# Patient Record
Sex: MALE | Race: BLACK OR AFRICAN AMERICAN | NOT HISPANIC OR LATINO | Employment: FULL TIME | URBAN - METROPOLITAN AREA
[De-identification: names, ages, dates, MRNs, and addresses within clinical notes are randomized per-mention and may not be internally consistent; named-entity substitution may affect disease eponyms.]

---

## 2017-08-09 ENCOUNTER — OFFICE VISIT (OUTPATIENT)
Dept: FAMILY MEDICINE | Facility: CLINIC | Age: 57
End: 2017-08-09
Payer: COMMERCIAL

## 2017-08-09 VITALS
SYSTOLIC BLOOD PRESSURE: 124 MMHG | TEMPERATURE: 98 F | RESPIRATION RATE: 16 BRPM | WEIGHT: 257 LBS | HEART RATE: 72 BPM | BODY MASS INDEX: 35.98 KG/M2 | HEIGHT: 71 IN | DIASTOLIC BLOOD PRESSURE: 88 MMHG

## 2017-08-09 DIAGNOSIS — I10 ESSENTIAL HYPERTENSION: Primary | ICD-10-CM

## 2017-08-09 DIAGNOSIS — J30.1 ACUTE SEASONAL ALLERGIC RHINITIS DUE TO POLLEN: ICD-10-CM

## 2017-08-09 DIAGNOSIS — Z00.00 WELL ADULT EXAM: ICD-10-CM

## 2017-08-09 PROBLEM — Z00.129 WELL ADOLESCENT VISIT: Status: RESOLVED | Noted: 2017-08-09 | Resolved: 2017-08-09

## 2017-08-09 PROBLEM — Z00.129 WELL ADOLESCENT VISIT: Status: ACTIVE | Noted: 2017-08-09

## 2017-08-09 LAB
ALBUMIN SERPL-MCNC: 4.3 G/DL (ref 3.1–4.7)
ALP SERPL-CCNC: 52 IU/L (ref 40–104)
ALT (SGPT): 34 IU/L (ref 3–33)
AST SERPL-CCNC: 21 IU/L (ref 10–40)
BASOPHILS NFR BLD: 0.1 K/UL (ref 0–0.2)
BASOPHILS NFR BLD: 0.7 %
BILIRUB SERPL-MCNC: 1.5 MG/DL (ref 0.3–1)
BILIRUBIN DIRECT+TOT PNL SERPL-MCNC: 0.2 MG/DL (ref 0–0.2)
BUN SERPL-MCNC: 15 MG/DL (ref 8–20)
CALCIUM SERPL-MCNC: 9.5 MG/DL (ref 7.7–10.4)
CHLORIDE: 105 MMOL/L (ref 98–110)
CO2 SERPL-SCNC: 27.1 MMOL/L (ref 22.8–31.6)
COMPLEXED PSA SERPL-MCNC: 0.7 NG/ML (ref 0–3)
CREATININE: 1.21 MG/DL (ref 0.6–1.4)
EOSINOPHIL NFR BLD: 0.2 K/UL (ref 0–0.7)
EOSINOPHIL NFR BLD: 3.1 %
ERYTHROCYTE [DISTWIDTH] IN BLOOD BY AUTOMATED COUNT: 12.2 % (ref 11.7–14.9)
GLUCOSE: 106 MG/DL (ref 70–99)
GRAN #: 4.6 K/UL (ref 1.4–6.5)
GRAN%: 59.6 %
HBA1C MFR BLD: 5.7 % (ref 3.1–6.5)
HCT VFR BLD AUTO: 46.2 % (ref 39–55)
HGB BLD-MCNC: 16.1 G/DL (ref 14–16)
IMMATURE GRANS (ABS): 0 K/UL (ref 0–1)
IMMATURE GRANULOCYTES: 0.4 %
LYMPH #: 2.1 K/UL (ref 1.2–3.4)
LYMPH%: 27.2 %
MCH RBC QN AUTO: 31.4 PG (ref 25–35)
MCHC RBC AUTO-ENTMCNC: 34.8 G/DL (ref 31–36)
MCV RBC AUTO: 90.1 FL (ref 80–100)
MONO #: 0.7 K/UL (ref 0.1–0.6)
MONO%: 9 %
NUCLEATED RBCS: 0 %
PLATELET # BLD AUTO: 262 K/UL (ref 140–440)
PMV BLD AUTO: 9.7 FL (ref 8.8–12.7)
POTASSIUM SERPL-SCNC: 4.8 MMOL/L (ref 3.5–5)
PROT SERPL-MCNC: 7.7 G/DL (ref 6–8.2)
RBC # BLD AUTO: 5.13 M/UL (ref 4.3–5.9)
SODIUM: 139 MMOL/L (ref 134–144)
WBC # BLD AUTO: 7.7 K/UL (ref 5–10)

## 2017-08-09 PROCEDURE — 99386 PREV VISIT NEW AGE 40-64: CPT | Mod: 25,,, | Performed by: FAMILY MEDICINE

## 2017-08-09 RX ORDER — VALSARTAN 80 MG/1
TABLET ORAL
COMMUNITY
Start: 2017-06-07 | End: 2017-08-09 | Stop reason: SDUPTHER

## 2017-08-09 RX ORDER — FLUTICASONE PROPIONATE 44 UG/1
1 AEROSOL, METERED RESPIRATORY (INHALATION) 2 TIMES DAILY
Qty: 10.6 G | Refills: 0 | Status: SHIPPED | OUTPATIENT
Start: 2017-08-09 | End: 2017-08-25

## 2017-08-09 RX ORDER — VALSARTAN 80 MG/1
80 TABLET ORAL DAILY
Qty: 90 TABLET | Refills: 3 | Status: SHIPPED | OUTPATIENT
Start: 2017-08-09 | End: 2018-05-21 | Stop reason: SDUPTHER

## 2017-08-09 NOTE — PATIENT INSTRUCTIONS
Diabetes: Learning About Serving and Portion Sizes     A good rule of thumb: Devote half your plate to vegetables and green salad. Split the other half between protein and starchy carbohydrates. Fruit makes a good dessert.     Servings and portions. Whats the difference? These terms can be very confusing. But learning to measure serving sizes can help you figure out how many carbohydrates (carbs) and other foods you eat each day. They are also powerful tools for managing your weight.  Servings and portions  Many different words are used to describe amounts of food. If your health care provider uses a term youre not sure of, dont be afraid to ask. It helps to know the difference between servings and portions:  · A serving size is a fixed size. Food producers use this term to describe their products. For example, the label on a cereal box could say that 1 cup of dry cereal = 1 serving.  · A portion (also called a helping) is how much you eat or how much you put on your plate at a meal. For example, you might eat 2 cups of cereal at breakfast.  Using serving information  The portion you choose to eat (such as 2 cups of cereal) may be more than one serving as listed on the food label (such as 1 cup of cereal). Thats why it helps to measure or weigh the food you eat. Because the food label values are based on servings, youll need to know how many servings you eat at one sitting.     Ounces: 2 to 3 ounces is about the size of your palm.       1 Cup: 1 cup (or a medium-sized piece) is about the size of your fist.       1/2 Cup: 1/2 cup is about the size of your cupped hand.      One tablespoon is about the size of your thumb.  One teaspoon is about the size of the tip of your thumb.  Keeping track of serving sizes  When youre planning for a snack or a meal, keep servings in mind. If you dont have measuring cups or a scale handy, there are ways to eyeball serving sizes, such as comparing your food to the size  of your hand (see pictures above).  Managing portion sizes  If your weight is a concern, reducing your portions can help. You can eat more than one serving of a food at once. But to keep from eating too much at one meal, learn how to manage your portions. A portion is the amount of each type of food on your plate. See the plate diagram for an example of balanced portions.  Date Last Reviewed: 3/1/2016  © 3400-2034 [employer]. [address] [place] [address], [place] [address]. All rights reserved. This information is not intended as a substitute for professional medical care. Always follow your healthcare professional's instructions.

## 2017-08-09 NOTE — PROGRESS NOTES
Subjective:       Patient ID: Quentin Palacios is a 56 y.o. male.    Chief Complaint: Annual Exam    HPI  Review of Systems   Constitutional: Negative for activity change, appetite change, chills, diaphoresis, fatigue, fever and unexpected weight change.   HENT: Negative for congestion, dental problem, drooling, ear discharge, ear pain, facial swelling, hearing loss, mouth sores, nosebleeds, postnasal drip, rhinorrhea, sinus pressure, sneezing, sore throat, tinnitus, trouble swallowing and voice change.    Eyes: Negative for photophobia, pain, discharge, redness, itching and visual disturbance.   Respiratory: Positive for cough (sl producitive cough). Negative for apnea, choking, chest tightness, shortness of breath, wheezing and stridor.    Cardiovascular: Negative for chest pain, palpitations and leg swelling.   Gastrointestinal: Negative for abdominal distention, abdominal pain, anal bleeding, blood in stool, constipation, diarrhea, nausea, rectal pain and vomiting.   Endocrine: Negative for cold intolerance, heat intolerance, polydipsia, polyphagia and polyuria.   Genitourinary: Negative for decreased urine volume, difficulty urinating, discharge, dysuria, enuresis, flank pain, frequency, genital sores, hematuria, penile pain, penile swelling, scrotal swelling, testicular pain and urgency.        Voids 2x night   Musculoskeletal: Positive for back pain (lower back stiffness). Negative for arthralgias, gait problem, joint swelling, myalgias, neck pain and neck stiffness.   Skin: Negative for color change, pallor, rash and wound.   Allergic/Immunologic: Negative for environmental allergies, food allergies and immunocompromised state.   Neurological: Negative for dizziness, tremors, seizures, syncope, facial asymmetry, speech difficulty, weakness, light-headedness, numbness and headaches.   Hematological: Negative for adenopathy. Does not bruise/bleed easily.   Psychiatric/Behavioral: Negative for agitation,  behavioral problems, confusion, decreased concentration, dysphoric mood, hallucinations, self-injury, sleep disturbance and suicidal ideas. The patient is not nervous/anxious and is not hyperactive.        Objective:      Physical Exam   Constitutional: He is oriented to person, place, and time. He appears well-developed and well-nourished.  Non-toxic appearance. He does not have a sickly appearance. He does not appear ill. No distress.   HENT:   Head: Normocephalic and atraumatic.   Right Ear: External ear normal.   Left Ear: External ear normal.   Nose: Nose normal.   Mouth/Throat: Oropharynx is clear and moist. No oropharyngeal exudate.   Eyes: Conjunctivae and EOM are normal. Pupils are equal, round, and reactive to light. Right eye exhibits no discharge. Left eye exhibits no discharge. No scleral icterus.   Neck: Normal range of motion. Neck supple. No JVD present. No tracheal deviation present. No thyromegaly present.   Cardiovascular: Normal rate, regular rhythm, normal heart sounds and intact distal pulses.  Exam reveals no gallop and no friction rub.    No murmur heard.  Pulmonary/Chest: Effort normal and breath sounds normal. No stridor. No respiratory distress. He has no wheezes. He has no rales. He exhibits no tenderness.   Abdominal: Soft. Bowel sounds are normal. He exhibits no distension and no mass. There is no tenderness. There is no rebound and no guarding. No hernia.   Genitourinary: Rectum normal, prostate normal, testes normal and penis normal. Rectal exam shows guaiac negative stool. Cremasteric reflex is present. Right testis shows no mass, no swelling and no tenderness. Right testis is descended. Cremasteric reflex is not absent on the right side. Left testis shows no mass, no swelling and no tenderness. Left testis is descended. Cremasteric reflex is not absent on the left side. Circumcised. No phimosis, paraphimosis, hypospadias, penile erythema or penile tenderness. No discharge found.    Musculoskeletal: Normal range of motion. He exhibits no edema, tenderness or deformity.   Lymphadenopathy:     He has no cervical adenopathy.   Neurological: He is alert and oriented to person, place, and time. He has normal reflexes. He displays normal reflexes. No cranial nerve deficit. He exhibits normal muscle tone. Coordination normal.   Skin: Skin is warm and dry. Capillary refill takes less than 2 seconds. No rash noted. He is not diaphoretic. No erythema. No pallor.   Psychiatric: He has a normal mood and affect. His behavior is normal. Judgment and thought content normal.   Nursing note and vitals reviewed.      Assessment:       1. Acute seasonal allergic rhinitis due to pollen    2. Essential hypertension    3. Well adolescent visit        Plan:       Quentin was seen today for annual exam.    Diagnoses and all orders for this visit:    Acute seasonal allergic rhinitis due to pollen  -     fluticasone (FLOVENT HFA) 44 mcg/actuation inhaler; Inhale 1 puff into the lungs 2 (two) times daily. Controller    Essential hypertension    Well adolescent visit  -     Ambulatory referral to Gastroenterology  -     Hemoglobin A1c; Future  -     PSA, Screening; Future  -     CBC auto differential; Future  -     Basic metabolic panel; Future  -     Hepatitis C antibody; Future  -     Hepatic function panel; Future  -     Hemoglobin A1c  -     PSA, Screening  -     CBC auto differential  -     Basic metabolic panel  -     Hepatitis C antibody  -     Hepatic function panel    Other orders  -     valsartan (DIOVAN) 80 MG tablet; Take 1 tablet (80 mg total) by mouth once daily.         Return in about 1 year (around 8/9/2018).

## 2017-08-10 LAB — HCV AB SERPL QL IA: 0.4 S/CO RATIO (ref 0–0.9)

## 2017-08-18 ENCOUNTER — TELEPHONE (OUTPATIENT)
Dept: FAMILY MEDICINE | Facility: CLINIC | Age: 57
End: 2017-08-18

## 2017-08-25 DIAGNOSIS — J30.1 CHRONIC ALLERGIC RHINITIS DUE TO POLLEN, UNSPECIFIED SEASONALITY: Primary | ICD-10-CM

## 2017-08-25 RX ORDER — FLUTICASONE PROPIONATE 50 MCG
1 SPRAY, SUSPENSION (ML) NASAL DAILY
Qty: 1 BOTTLE | Refills: 0 | Status: SHIPPED | OUTPATIENT
Start: 2017-08-25 | End: 2018-12-21

## 2017-11-13 PROBLEM — Z00.00 WELL ADULT EXAM: Status: RESOLVED | Noted: 2017-08-09 | Resolved: 2017-11-13

## 2018-02-22 ENCOUNTER — OFFICE VISIT (OUTPATIENT)
Dept: FAMILY MEDICINE | Facility: CLINIC | Age: 58
End: 2018-02-22
Payer: COMMERCIAL

## 2018-02-22 VITALS
HEIGHT: 70 IN | SYSTOLIC BLOOD PRESSURE: 148 MMHG | WEIGHT: 272 LBS | TEMPERATURE: 98 F | DIASTOLIC BLOOD PRESSURE: 90 MMHG | RESPIRATION RATE: 16 BRPM | BODY MASS INDEX: 38.94 KG/M2 | HEART RATE: 76 BPM

## 2018-02-22 DIAGNOSIS — F41.9 ANXIETY: ICD-10-CM

## 2018-02-22 DIAGNOSIS — Z00.00 WELL ADULT EXAM: ICD-10-CM

## 2018-02-22 DIAGNOSIS — I10 ESSENTIAL HYPERTENSION: ICD-10-CM

## 2018-02-22 DIAGNOSIS — M17.12 PRIMARY OSTEOARTHRITIS OF LEFT KNEE: Primary | ICD-10-CM

## 2018-02-22 PROCEDURE — 3008F BODY MASS INDEX DOCD: CPT | Mod: ,,, | Performed by: FAMILY MEDICINE

## 2018-02-22 PROCEDURE — 99214 OFFICE O/P EST MOD 30 MIN: CPT | Mod: ,,, | Performed by: FAMILY MEDICINE

## 2018-02-22 RX ORDER — SERTRALINE HYDROCHLORIDE 50 MG/1
50 TABLET, FILM COATED ORAL DAILY
Qty: 30 TABLET | Refills: 11 | Status: SHIPPED | OUTPATIENT
Start: 2018-02-22 | End: 2018-12-21

## 2018-02-22 RX ORDER — MELOXICAM 7.5 MG/1
7.5 TABLET ORAL DAILY
Qty: 30 TABLET | Refills: 11 | Status: SHIPPED | OUTPATIENT
Start: 2018-02-22 | End: 2018-12-21

## 2018-02-22 NOTE — PROGRESS NOTES
Subjective:       Patient ID: Quentin Palacios is a 57 y.o. male.    Chief Complaint: Knee Pain (left ) and Anxiety      HPI    Allergies and Medications:   Review of patient's allergies indicates:  No Known Allergies  Current Outpatient Prescriptions   Medication Sig Dispense Refill    fluticasone (FLONASE) 50 mcg/actuation nasal spray 1 spray by Each Nare route once daily. 1 Bottle 0    valsartan (DIOVAN) 80 MG tablet Take 1 tablet (80 mg total) by mouth once daily. 90 tablet 3     No current facility-administered medications for this visit.        Family History:   Family History   Problem Relation Age of Onset    Diabetes Mother     Heart failure Mother     Hypertension Mother     Diabetes Father     Hypertension Father     Diabetes Sister     Hypertension Brother     Cancer Maternal Grandfather        Social History:   Social History     Social History    Marital status: Single     Spouse name: N/A    Number of children: N/A    Years of education: N/A     Occupational History    Not on file.     Social History Main Topics    Smoking status: Never Smoker    Smokeless tobacco: Never Used    Alcohol use Yes      Comment: occasionally    Drug use: No    Sexual activity: Not on file     Other Topics Concern    Not on file     Social History Narrative    No narrative on file       Review of Systems    Objective:     Vitals:    02/22/18 0928   BP: (!) 148/90   Pulse: 76   Resp: 16   Temp: 98.2 °F (36.8 °C)        Physical Exam    Assessment:       No diagnosis found.    Plan:       There are no diagnoses linked to this encounter.     No Follow-up on file.

## 2018-02-22 NOTE — PROGRESS NOTES
Subjective:       Patient ID: Quentin Palacios is a 57 y.o. male.    Chief Complaint: Knee Pain (left ) and Anxiety      Patient has gained 15 pounds since last visit.  Wt Readings from Last 1 Encounters:  02/22/18 0928 : 123.4 kg (272 lb)        Knee Pain    The incident occurred less than 1 hour ago (x years.). 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 The pain is present in the left knee. The quality of the pain is described as aching (swelling). The pain is at a severity of 4/10. The pain is mild. The pain has been improving since onset. Associated symptoms include a loss of motion and numbness. Nothing aggravates the symptoms. He has tried nothing for the symptoms. The treatment provided no relief.   Anxiety   Presents for follow-up visit. Symptoms include decreased concentration, excessive worry and nervous/anxious behavior. Patient reports no chest pain, compulsions, confusion, depressed mood, dizziness, dry mouth, feeling of choking, hyperventilation, impotence, insomnia, irritability, malaise, muscle tension, nausea, obsessions, palpitations, panic, restlessness, shortness of breath or suicidal ideas. The quality of sleep is poor. Nighttime awakenings: occasional.           Allergies and Medications:   Review of patient's allergies indicates:  No Known Allergies  Current Outpatient Prescriptions   Medication Sig Dispense Refill    fluticasone (FLONASE) 50 mcg/actuation nasal spray 1 spray by Each Nare route once daily. 1 Bottle 0    meloxicam (MOBIC) 7.5 MG tablet Take 1 tablet (7.5 mg total) by mouth once daily. 30 tablet 11    sertraline (ZOLOFT) 50 MG tablet Take 1 tablet (50 mg total) by mouth once daily. 30 tablet 11    valsartan (DIOVAN) 80 MG tablet Take 1 tablet (80 mg total) by mouth once daily. 90 tablet 3     No current facility-administered medications for this visit.        Family History:   Family History   Problem Relation Age of Onset    Diabetes Mother     Heart failure  Mother     Hypertension Mother     Diabetes Father     Hypertension Father     Diabetes Sister     Hypertension Brother     Cancer Maternal Grandfather        Social History:   Social History     Social History    Marital status: Single     Spouse name: N/A    Number of children: N/A    Years of education: N/A     Occupational History    Not on file.     Social History Main Topics    Smoking status: Never Smoker    Smokeless tobacco: Never Used    Alcohol use Yes      Comment: occasionally    Drug use: No    Sexual activity: Not on file     Other Topics Concern    Not on file     Social History Narrative    No narrative on file       Review of Systems   Constitutional: Negative for irritability.   Respiratory: Negative for shortness of breath.    Cardiovascular: Negative for chest pain and palpitations.   Gastrointestinal: Negative for nausea.   Genitourinary: Negative for impotence.   Neurological: Positive for numbness. Negative for dizziness.   Psychiatric/Behavioral: Positive for decreased concentration. Negative for confusion and suicidal ideas. The patient is nervous/anxious. The patient does not have insomnia.        Objective:     Vitals:    02/22/18 0928   BP: (!) 148/90   Pulse: 76   Resp: 16   Temp: 98.2 °F (36.8 °C)        Physical Exam    Assessment:       1. Primary osteoarthritis of left knee    2. Essential hypertension Has not been taking his Diovan regularly    3. Well adult exam    4. Anxiety        Plan:       Quentin was seen today for knee pain and anxiety.    Diagnoses and all orders for this visit:    Primary osteoarthritis of left knee  -     meloxicam (MOBIC) 7.5 MG tablet; Take 1 tablet (7.5 mg total) by mouth once daily.    Essential hypertensionTake Diovan as prescribed every day and really recheck his pressure in 2 weeks    Well adult exam    Anxiety  -     sertraline (ZOLOFT) 50 MG tablet; Take 1 tablet (50 mg total) by mouth once daily.         Follow-up in about 1  month (around 3/22/2018) for annual.

## 2018-05-21 DIAGNOSIS — E66.09 CLASS 2 OBESITY DUE TO EXCESS CALORIES WITHOUT SERIOUS COMORBIDITY WITH BODY MASS INDEX (BMI) OF 39.0 TO 39.9 IN ADULT: ICD-10-CM

## 2018-05-22 PROBLEM — E66.812 CLASS 2 OBESITY WITHOUT SERIOUS COMORBIDITY WITH BODY MASS INDEX (BMI) OF 39.0 TO 39.9 IN ADULT: Status: ACTIVE | Noted: 2018-05-22

## 2018-05-22 PROBLEM — E66.9 CLASS 2 OBESITY WITHOUT SERIOUS COMORBIDITY WITH BODY MASS INDEX (BMI) OF 39.0 TO 39.9 IN ADULT: Status: ACTIVE | Noted: 2018-05-22

## 2018-05-22 RX ORDER — VALSARTAN 80 MG/1
TABLET ORAL
Qty: 90 TABLET | Refills: 4 | Status: SHIPPED | OUTPATIENT
Start: 2018-05-22 | End: 2018-12-21

## 2018-05-28 PROBLEM — Z00.00 WELL ADULT EXAM: Status: RESOLVED | Noted: 2018-02-22 | Resolved: 2018-05-28

## 2018-08-22 ENCOUNTER — TELEPHONE (OUTPATIENT)
Dept: FAMILY MEDICINE | Facility: CLINIC | Age: 58
End: 2018-08-22

## 2018-08-22 DIAGNOSIS — E66.09 CLASS 2 OBESITY DUE TO EXCESS CALORIES WITHOUT SERIOUS COMORBIDITY WITH BODY MASS INDEX (BMI) OF 39.0 TO 39.9 IN ADULT: ICD-10-CM

## 2018-08-22 DIAGNOSIS — I10 ESSENTIAL HYPERTENSION: Primary | ICD-10-CM

## 2018-08-22 PROBLEM — E66.9 CLASS 2 OBESITY WITHOUT SERIOUS COMORBIDITY WITH BODY MASS INDEX (BMI) OF 39.0 TO 39.9 IN ADULT: Status: RESOLVED | Noted: 2018-05-22 | Resolved: 2018-08-22

## 2018-08-22 PROBLEM — E66.812 CLASS 2 OBESITY WITHOUT SERIOUS COMORBIDITY WITH BODY MASS INDEX (BMI) OF 39.0 TO 39.9 IN ADULT: Status: RESOLVED | Noted: 2018-05-22 | Resolved: 2018-08-22

## 2018-08-22 RX ORDER — IRBESARTAN 150 MG/1
150 TABLET ORAL NIGHTLY
Qty: 90 TABLET | Refills: 3 | Status: SHIPPED | OUTPATIENT
Start: 2018-08-22 | End: 2018-12-21

## 2018-12-21 ENCOUNTER — OFFICE VISIT (OUTPATIENT)
Dept: FAMILY MEDICINE | Facility: CLINIC | Age: 58
End: 2018-12-21
Payer: COMMERCIAL

## 2018-12-21 VITALS
TEMPERATURE: 98 F | HEART RATE: 78 BPM | OXYGEN SATURATION: 96 % | SYSTOLIC BLOOD PRESSURE: 150 MMHG | HEIGHT: 70 IN | WEIGHT: 275 LBS | DIASTOLIC BLOOD PRESSURE: 96 MMHG | BODY MASS INDEX: 39.37 KG/M2

## 2018-12-21 DIAGNOSIS — F41.9 ANXIETY: ICD-10-CM

## 2018-12-21 DIAGNOSIS — R00.2 PALPITATIONS: ICD-10-CM

## 2018-12-21 DIAGNOSIS — F34.1 DYSTHYMIA: ICD-10-CM

## 2018-12-21 DIAGNOSIS — I10 ESSENTIAL HYPERTENSION: Primary | ICD-10-CM

## 2018-12-21 PROBLEM — F32.A DEPRESSION: Status: ACTIVE | Noted: 2018-12-21

## 2018-12-21 PROCEDURE — 99214 OFFICE O/P EST MOD 30 MIN: CPT | Mod: 25,,, | Performed by: FAMILY MEDICINE

## 2018-12-21 PROCEDURE — 3080F DIAST BP >= 90 MM HG: CPT | Mod: ,,, | Performed by: FAMILY MEDICINE

## 2018-12-21 PROCEDURE — 3008F BODY MASS INDEX DOCD: CPT | Mod: ,,, | Performed by: FAMILY MEDICINE

## 2018-12-21 PROCEDURE — 3077F SYST BP >= 140 MM HG: CPT | Mod: ,,, | Performed by: FAMILY MEDICINE

## 2018-12-21 RX ORDER — LORAZEPAM 0.5 MG/1
0.5 TABLET ORAL EVERY 6 HOURS PRN
Qty: 7 TABLET | Refills: 0 | Status: SHIPPED | OUTPATIENT
Start: 2018-12-21 | End: 2019-01-18 | Stop reason: SDUPTHER

## 2018-12-21 RX ORDER — VALSARTAN AND HYDROCHLOROTHIAZIDE 160; 25 MG/1; MG/1
1 TABLET ORAL DAILY
Qty: 90 TABLET | Refills: 3 | Status: SHIPPED | OUTPATIENT
Start: 2018-12-21 | End: 2019-04-18 | Stop reason: SDUPTHER

## 2018-12-21 RX ORDER — PAROXETINE HYDROCHLORIDE 20 MG/1
20 TABLET, FILM COATED ORAL EVERY MORNING
Qty: 30 TABLET | Refills: 11 | Status: SHIPPED | OUTPATIENT
Start: 2018-12-21 | End: 2019-01-18

## 2018-12-21 NOTE — PROGRESS NOTES
Subjective:       Patient ID: Quentin Palacios is a 58 y.o. male.    Chief Complaint: Depression; Anxiety; and Blood pressure check      Blood pressure has been running high in the past.  BP Readings from Last 3 Encounters:  12/21/18 : (!) 150/96  02/22/18 : (!) 148/90  08/09/17 : 124/88  Also trouble focusing, agitation.  Was also seen by a nurse at work and found to have an irregular heartbeat      Depression   Visit Type: initial  Onset of symptoms: more than 1 year ago  Progression since onset: rapidly worsening  Patient presents with the following symptoms: anhedonia, decreased concentration, depressed mood and panic ( anxiety attacks.).  Patient is not experiencing: suicidal ideas and suicidal planning.    Anxiety   Symptoms include decreased concentration, depressed mood and panic ( anxiety attacks.). Patient reports no suicidal ideas.           Allergies and Medications:   Review of patient's allergies indicates:  No Known Allergies  Current Outpatient Medications   Medication Sig Dispense Refill    valsartan (DIOVAN) 80 MG tablet TAKE 1 TABLET DAILY 90 tablet 4     No current facility-administered medications for this visit.        Family History:   Family History   Problem Relation Age of Onset    Diabetes Mother     Heart failure Mother     Hypertension Mother     Diabetes Father     Hypertension Father     Diabetes Sister     Hypertension Brother     Cancer Maternal Grandfather        Social History:   Social History     Socioeconomic History    Marital status:      Spouse name: Not on file    Number of children: Not on file    Years of education: Not on file    Highest education level: Not on file   Social Needs    Financial resource strain: Not on file    Food insecurity - worry: Not on file    Food insecurity - inability: Not on file    Transportation needs - medical: Not on file    Transportation needs - non-medical: Not on file   Occupational History    Not on file   Tobacco  Use    Smoking status: Never Smoker    Smokeless tobacco: Never Used   Substance and Sexual Activity    Alcohol use: Yes     Comment: occasionally    Drug use: No    Sexual activity: Yes   Other Topics Concern    Not on file   Social History Narrative    Not on file       Review of Systems   Psychiatric/Behavioral: Positive for decreased concentration and depression. Negative for suicidal ideas.       Objective:     Vitals:    12/21/18 0949   BP: (!) 150/96   Pulse: 78   Temp: 97.6 °F (36.4 °C)        Physical Exam   Cardiovascular: Normal rate, regular rhythm, normal heart sounds and intact distal pulses. Exam reveals no gallop and no friction rub.   No murmur heard.  Pulmonary/Chest: Effort normal and breath sounds normal.     EKG was done and showed some premature atrial contractions but no other arrhythmias  Assessment:       1. Essential hypertension    2. Dysthymia    3. Palpitations he has mild PACs probably related to anxiety and elevated blood pressure        Plan:       Quentin was seen today for depression, anxiety and blood pressure check.    Diagnoses and all orders for this visit:    Essential hypertension    Dysthymia    Palpitations         No Follow-up on file.

## 2019-01-18 ENCOUNTER — OFFICE VISIT (OUTPATIENT)
Dept: FAMILY MEDICINE | Facility: CLINIC | Age: 59
End: 2019-01-18
Payer: COMMERCIAL

## 2019-01-18 ENCOUNTER — TELEPHONE (OUTPATIENT)
Dept: FAMILY MEDICINE | Facility: CLINIC | Age: 59
End: 2019-01-18

## 2019-01-18 VITALS
OXYGEN SATURATION: 98 % | SYSTOLIC BLOOD PRESSURE: 138 MMHG | TEMPERATURE: 98 F | HEART RATE: 72 BPM | BODY MASS INDEX: 39.22 KG/M2 | WEIGHT: 274 LBS | DIASTOLIC BLOOD PRESSURE: 86 MMHG | HEIGHT: 70 IN

## 2019-01-18 DIAGNOSIS — E66.9 OBESITY (BMI 35.0-39.9 WITHOUT COMORBIDITY): ICD-10-CM

## 2019-01-18 DIAGNOSIS — Z13.6 ENCOUNTER FOR LIPID SCREENING FOR CARDIOVASCULAR DISEASE: Primary | ICD-10-CM

## 2019-01-18 DIAGNOSIS — Z12.5 ENCOUNTER FOR PROSTATE CANCER SCREENING: ICD-10-CM

## 2019-01-18 DIAGNOSIS — Z00.00 WELL ADULT EXAM: ICD-10-CM

## 2019-01-18 DIAGNOSIS — F34.1 DYSTHYMIA: ICD-10-CM

## 2019-01-18 DIAGNOSIS — F41.9 ANXIETY: ICD-10-CM

## 2019-01-18 DIAGNOSIS — Z13.220 ENCOUNTER FOR LIPID SCREENING FOR CARDIOVASCULAR DISEASE: Primary | ICD-10-CM

## 2019-01-18 LAB
ALBUMIN SERPL-MCNC: 4 G/DL (ref 3.1–4.7)
ALP SERPL-CCNC: 56 IU/L (ref 40–104)
ALT (SGPT): 27 IU/L (ref 3–33)
AST SERPL-CCNC: 17 IU/L (ref 10–40)
BASOPHILS NFR BLD: 0.1 K/UL (ref 0–0.2)
BASOPHILS NFR BLD: 0.6 %
BILIRUB SERPL-MCNC: 1.1 MG/DL (ref 0.3–1)
BUN SERPL-MCNC: 17 MG/DL (ref 8–20)
CALCIUM SERPL-MCNC: 9.1 MG/DL (ref 7.7–10.4)
CHLORIDE: 103 MMOL/L (ref 98–110)
CO2 SERPL-SCNC: 28 MMOL/L (ref 22.8–31.6)
COMPLEXED PSA SERPL-MCNC: 1.3 NG/ML (ref 0–3)
CREATININE: 1.11 MG/DL (ref 0.6–1.4)
EOSINOPHIL NFR BLD: 0.2 K/UL (ref 0–0.7)
EOSINOPHIL NFR BLD: 1.9 %
ERYTHROCYTE [DISTWIDTH] IN BLOOD BY AUTOMATED COUNT: 12.7 % (ref 11.7–14.9)
GLUCOSE: 102 MG/DL (ref 70–99)
GRAN #: 6.1 K/UL (ref 1.4–6.5)
GRAN%: 67.5 %
HCT VFR BLD AUTO: 45 % (ref 39–55)
HGB BLD-MCNC: 15.2 G/DL (ref 14–16)
IMMATURE GRANS (ABS): 0 K/UL (ref 0–1)
IMMATURE GRANULOCYTES: 0.3 %
LYMPH #: 1.9 K/UL (ref 1.2–3.4)
LYMPH%: 21.4 %
MCH RBC QN AUTO: 31.3 PG (ref 25–35)
MCHC RBC AUTO-ENTMCNC: 33.8 G/DL (ref 31–36)
MCV RBC AUTO: 92.6 FL (ref 80–100)
MONO #: 0.7 K/UL (ref 0.1–0.6)
MONO%: 8.3 %
NUCLEATED RBCS: 0 %
PLATELET # BLD AUTO: 253 K/UL (ref 140–440)
PMV BLD AUTO: 10.7 FL (ref 8.8–12.7)
POTASSIUM SERPL-SCNC: 4.1 MMOL/L (ref 3.5–5)
PROT SERPL-MCNC: 7.7 G/DL (ref 6–8.2)
RBC # BLD AUTO: 4.86 M/UL (ref 4.3–5.9)
SODIUM: 138 MMOL/L (ref 134–144)
VITAMIN D, 1,25 (OH)2: 21.2 NG/ML (ref 30–100)
WBC # BLD AUTO: 9 K/UL (ref 5–10)

## 2019-01-18 PROCEDURE — 3079F PR MOST RECENT DIASTOLIC BLOOD PRESSURE 80-89 MM HG: ICD-10-PCS | Mod: ,,, | Performed by: FAMILY MEDICINE

## 2019-01-18 PROCEDURE — 99213 OFFICE O/P EST LOW 20 MIN: CPT | Mod: ,,, | Performed by: FAMILY MEDICINE

## 2019-01-18 PROCEDURE — 99213 PR OFFICE/OUTPT VISIT, EST, LEVL III, 20-29 MIN: ICD-10-PCS | Mod: ,,, | Performed by: FAMILY MEDICINE

## 2019-01-18 PROCEDURE — 3075F SYST BP GE 130 - 139MM HG: CPT | Mod: ,,, | Performed by: FAMILY MEDICINE

## 2019-01-18 PROCEDURE — 3075F PR MOST RECENT SYSTOLIC BLOOD PRESS GE 130-139MM HG: ICD-10-PCS | Mod: ,,, | Performed by: FAMILY MEDICINE

## 2019-01-18 PROCEDURE — 3079F DIAST BP 80-89 MM HG: CPT | Mod: ,,, | Performed by: FAMILY MEDICINE

## 2019-01-18 PROCEDURE — 3008F BODY MASS INDEX DOCD: CPT | Mod: ,,, | Performed by: FAMILY MEDICINE

## 2019-01-18 PROCEDURE — 3008F PR BODY MASS INDEX (BMI) DOCUMENTED: ICD-10-PCS | Mod: ,,, | Performed by: FAMILY MEDICINE

## 2019-01-18 RX ORDER — LORAZEPAM 0.5 MG/1
0.5 TABLET ORAL EVERY 6 HOURS PRN
Qty: 30 TABLET | Refills: 0 | Status: SHIPPED | OUTPATIENT
Start: 2019-01-18 | End: 2021-01-08

## 2019-01-18 RX ORDER — PAROXETINE HYDROCHLORIDE 40 MG/1
40 TABLET, FILM COATED ORAL EVERY MORNING
Qty: 30 TABLET | Refills: 11 | Status: SHIPPED | OUTPATIENT
Start: 2019-01-18 | End: 2019-04-03

## 2019-01-18 RX ORDER — VIT C/E/ZN/COPPR/LUTEIN/ZEAXAN 250MG-90MG
1000 CAPSULE ORAL DAILY
Qty: 90 CAPSULE | Refills: 3 | Status: SHIPPED | OUTPATIENT
Start: 2019-01-18 | End: 2020-01-18

## 2019-01-18 NOTE — PROGRESS NOTES
Subjective:       Patient ID: Quentin Palacios is a 58 y.o. male.    Chief Complaint: Depression and Anxiety      Patient has patient has anxiety disorder and agoraphobia and is here for follow-up he says the Ativan works great in the situations when he has to of present or perform in front of large groups of people. Paxil is helping but he was to go to higher dose. No side effects or contraindications.        Depression   Visit Type: follow-up  Patient is not experiencing: anhedonia, chest pain, choking sensation, compulsions, confusion, decreased concentration, depressed mood, dizziness, dry mouth, excessive worry, fatigue, feelings of hopelessness, feelings of worthlessness, hypersomnia, hyperventilation, impotence, insomnia, irritability, malaise, memory impairment, muscle tension, nausea, nervousness/anxiety, obsessions, palpitations, panic, psychomotor agitation, psychomotor retardation, restlessness, shortness of breath, suicidal ideas, suicidal planning, thoughts of death, weight gain and weight loss.  Frequency of symptoms: constantly   Severity: moderate   Sleep quality: good  Nighttime awakenings: occasional    Anxiety   Patient reports no compulsions, confusion, decreased concentration, depressed mood, dry mouth, excessive worry, hyperventilation, impotence, insomnia, irritability, malaise, muscle tension, nervous/anxious behavior, obsessions, palpitations, panic, restlessness, shortness of breath or suicidal ideas.           Allergies and Medications:   Review of patient's allergies indicates:  No Known Allergies  Current Outpatient Medications   Medication Sig Dispense Refill    LORazepam (ATIVAN) 0.5 MG tablet Take 1 tablet (0.5 mg total) by mouth every 6 (six) hours as needed for Anxiety. 7 tablet 0    paroxetine (PAXIL) 20 MG tablet Take 1 tablet (20 mg total) by mouth every morning. 30 tablet 11    valsartan-hydrochlorothiazide (DIOVAN-HCT) 160-25 mg per tablet Take 1 tablet by mouth once daily.  90 tablet 3     No current facility-administered medications for this visit.        Family History:   Family History   Problem Relation Age of Onset    Diabetes Mother     Heart failure Mother     Hypertension Mother     Diabetes Father     Hypertension Father     Diabetes Sister     Hypertension Brother     Cancer Maternal Grandfather        Social History:   Social History     Socioeconomic History    Marital status:      Spouse name: Not on file    Number of children: Not on file    Years of education: Not on file    Highest education level: Not on file   Social Needs    Financial resource strain: Not on file    Food insecurity - worry: Not on file    Food insecurity - inability: Not on file    Transportation needs - medical: Not on file    Transportation needs - non-medical: Not on file   Occupational History    Not on file   Tobacco Use    Smoking status: Never Smoker    Smokeless tobacco: Never Used   Substance and Sexual Activity    Alcohol use: Yes     Comment: occasionally    Drug use: No    Sexual activity: Yes   Other Topics Concern    Not on file   Social History Narrative    Not on file       Review of Systems   Constitutional: Negative for irritability, weight gain and weight loss.   Respiratory: Negative for choking and shortness of breath.    Cardiovascular: Negative for palpitations.   Genitourinary: Negative for impotence.   Psychiatric/Behavioral: Positive for depression. Negative for confusion, decreased concentration and suicidal ideas. The patient is not nervous/anxious and does not have insomnia.        Objective:     Vitals:    01/18/19 0935   BP: 138/86   Pulse: 72   Temp: 97.9 °F (36.6 °C)        Physical Exam    Assessment:       No diagnosis found.    Plan:       There are no diagnoses linked to this encounter.     No Follow-up on file.

## 2019-01-18 NOTE — TELEPHONE ENCOUNTER
----- Message from Oz Pérez MD sent at 1/18/2019  4:32 PM CST -----  Low vitamin D level. Will add vitamin D 1000 units per day and recheck in one month

## 2019-01-19 LAB — HCV AB SERPL QL IA: <0.1 S/CO RATIO (ref 0–0.9)

## 2019-01-21 ENCOUNTER — TELEPHONE (OUTPATIENT)
Dept: FAMILY MEDICINE | Facility: CLINIC | Age: 59
End: 2019-01-21

## 2019-04-03 ENCOUNTER — OFFICE VISIT (OUTPATIENT)
Dept: FAMILY MEDICINE | Facility: CLINIC | Age: 59
End: 2019-04-03
Payer: COMMERCIAL

## 2019-04-03 VITALS
BODY MASS INDEX: 39.96 KG/M2 | DIASTOLIC BLOOD PRESSURE: 98 MMHG | RESPIRATION RATE: 20 BRPM | OXYGEN SATURATION: 98 % | HEIGHT: 70 IN | WEIGHT: 279.13 LBS | SYSTOLIC BLOOD PRESSURE: 148 MMHG | TEMPERATURE: 98 F | HEART RATE: 89 BPM

## 2019-04-03 DIAGNOSIS — R00.2 PALPITATIONS: Primary | ICD-10-CM

## 2019-04-03 DIAGNOSIS — R42 DIZZINESS: ICD-10-CM

## 2019-04-03 DIAGNOSIS — I10 ESSENTIAL HYPERTENSION: ICD-10-CM

## 2019-04-03 DIAGNOSIS — F41.9 ANXIETY: ICD-10-CM

## 2019-04-03 PROCEDURE — 3077F PR MOST RECENT SYSTOLIC BLOOD PRESSURE >= 140 MM HG: ICD-10-PCS | Mod: ,,, | Performed by: FAMILY MEDICINE

## 2019-04-03 PROCEDURE — 3008F PR BODY MASS INDEX (BMI) DOCUMENTED: ICD-10-PCS | Mod: ,,, | Performed by: FAMILY MEDICINE

## 2019-04-03 PROCEDURE — 3077F SYST BP >= 140 MM HG: CPT | Mod: ,,, | Performed by: FAMILY MEDICINE

## 2019-04-03 PROCEDURE — 3080F PR MOST RECENT DIASTOLIC BLOOD PRESSURE >= 90 MM HG: ICD-10-PCS | Mod: ,,, | Performed by: FAMILY MEDICINE

## 2019-04-03 PROCEDURE — 99213 OFFICE O/P EST LOW 20 MIN: CPT | Mod: ,,, | Performed by: FAMILY MEDICINE

## 2019-04-03 PROCEDURE — 99213 PR OFFICE/OUTPT VISIT, EST, LEVL III, 20-29 MIN: ICD-10-PCS | Mod: ,,, | Performed by: FAMILY MEDICINE

## 2019-04-03 PROCEDURE — 3080F DIAST BP >= 90 MM HG: CPT | Mod: ,,, | Performed by: FAMILY MEDICINE

## 2019-04-03 PROCEDURE — 3008F BODY MASS INDEX DOCD: CPT | Mod: ,,, | Performed by: FAMILY MEDICINE

## 2019-04-03 RX ORDER — METOPROLOL SUCCINATE 25 MG/1
25 TABLET, EXTENDED RELEASE ORAL DAILY
Qty: 30 TABLET | Refills: 11 | Status: SHIPPED | OUTPATIENT
Start: 2019-04-03 | End: 2020-05-15

## 2019-04-03 RX ORDER — ESCITALOPRAM OXALATE 10 MG/1
10 TABLET ORAL DAILY
Qty: 30 TABLET | Refills: 11 | Status: SHIPPED | OUTPATIENT
Start: 2019-04-03 | End: 2023-07-28

## 2019-04-03 NOTE — PROGRESS NOTES
Subjective:       Patient ID: Quentin Palacios is a 58 y.o. male.    Chief Complaint: Dizziness (x2 days but he had an episode 2 weeks ago) and Nausea      Transient dizziness, just for a fraction of a second when turning his head left or right. Associated with some transient nausea as well. Occur with exercise on the treadmill. Is also history of motion sickness in the past.  Tinkering in left heel and went occurs with dizzy spells.  Stopped Paxil 2 weeks ago.      Dizziness:   Chronicity:  Recurrent  Onset:  Yesterday  Pain Scale:  0/10  Duration:  Very brief  Dizziness characteristics:  Spacial disorientation, off-balance, lightheaded/impending faint and motion-sickness  Frequency of Spells:  Hourly   Associated symptoms: nausea.no hearing loss, no aural fullness, no palpitations and no chest pain.   nausea  Nausea   Associated symptoms include nausea. Pertinent negatives include no chest pain.       Allergies and Medications:   Review of patient's allergies indicates:  No Known Allergies  Current Outpatient Medications   Medication Sig Dispense Refill    cholecalciferol, vitamin D3, (VITAMIN D3) 1,000 unit capsule Take 1 capsule (1,000 Units total) by mouth once daily. 90 capsule 3    LORazepam (ATIVAN) 0.5 MG tablet Take 1 tablet (0.5 mg total) by mouth every 6 (six) hours as needed for Anxiety. 30 tablet 0    valsartan-hydrochlorothiazide (DIOVAN-HCT) 160-25 mg per tablet Take 1 tablet by mouth once daily. 90 tablet 3    escitalopram oxalate (LEXAPRO) 10 MG tablet Take 1 tablet (10 mg total) by mouth once daily. 30 tablet 11    metoprolol succinate (TOPROL-XL) 25 MG 24 hr tablet Take 1 tablet (25 mg total) by mouth once daily. 30 tablet 11     No current facility-administered medications for this visit.        Family History:   Family History   Problem Relation Age of Onset    Diabetes Mother     Heart failure Mother     Hypertension Mother     Diabetes Father     Hypertension Father     Diabetes  Sister     Hypertension Brother     Cancer Maternal Grandfather        Social History:       Review of Systems   HENT: Negative for hearing loss.    Cardiovascular: Negative for chest pain and palpitations.   Gastrointestinal: Positive for nausea.   Neurological: Positive for dizziness.       Objective:     Vitals:    04/03/19 1444   BP: (!) 140/92   Pulse: 89   Resp: 20   Temp: 97.8 °F (36.6 °C)        Physical Exam   Constitutional: He appears well-developed and well-nourished.   Cardiovascular: Normal rate, regular rhythm, normal heart sounds and intact distal pulses. Exam reveals no gallop and no friction rub.   No murmur heard.  Pulmonary/Chest: Effort normal and breath sounds normal. No stridor. No respiratory distress. He has no wheezes. He has no rales. He exhibits no tenderness.   Neurological:   Presacral round and reactive to light and accommodation extraocular movements intact cranial nerves II through XII grossly intact. Negative Ana Maria-Hallpike left and right.       Assessment:       1. Palpitations -did not get to see the cardiologist at that time.    2. Dizziness -transient and benign normal neurologic exam today.        Plan:       Quentin was seen today for dizziness and nausea.    Diagnoses and all orders for this visit:    Palpitations  -     Ambulatory consult to Cardiology  -     metoprolol succinate (TOPROL-XL) 25 MG 24 hr tablet; Take 1 tablet (25 mg total) by mouth once daily.    Dizziness  -     Ambulatory consult to Cardiology  -     metoprolol succinate (TOPROL-XL) 25 MG 24 hr tablet; Take 1 tablet (25 mg total) by mouth once daily.    Essential hypertension    Anxiety  -     escitalopram oxalate (LEXAPRO) 10 MG tablet; Take 1 tablet (10 mg total) by mouth once daily.         Follow up in about 1 month (around 5/3/2019).

## 2019-04-11 ENCOUNTER — TELEPHONE (OUTPATIENT)
Dept: FAMILY MEDICINE | Facility: CLINIC | Age: 59
End: 2019-04-11

## 2019-04-11 NOTE — TELEPHONE ENCOUNTER
Pt LM on VM that BP was 180/100.  Returned call and was unable to reach the patient.  VM was full and unable to leave message.

## 2019-04-11 NOTE — TELEPHONE ENCOUNTER
Pt left another message re his BP of 180/108.  Returned the call.  The patient was pulling up at the cardiologist office for a visit.  I instructed the patient to address the matter with his cardiologist immediately.   He verbalized understanding.  Should they not address same, he should immediately go to the ER.

## 2019-04-18 ENCOUNTER — OFFICE VISIT (OUTPATIENT)
Dept: FAMILY MEDICINE | Facility: CLINIC | Age: 59
End: 2019-04-18
Payer: COMMERCIAL

## 2019-04-18 VITALS
HEIGHT: 70 IN | OXYGEN SATURATION: 95 % | DIASTOLIC BLOOD PRESSURE: 102 MMHG | BODY MASS INDEX: 40.84 KG/M2 | HEART RATE: 82 BPM | TEMPERATURE: 99 F | WEIGHT: 285.31 LBS | SYSTOLIC BLOOD PRESSURE: 148 MMHG

## 2019-04-18 DIAGNOSIS — Z12.11 SCREENING FOR COLON CANCER: ICD-10-CM

## 2019-04-18 DIAGNOSIS — Z13.6 ENCOUNTER FOR LIPID SCREENING FOR CARDIOVASCULAR DISEASE: ICD-10-CM

## 2019-04-18 DIAGNOSIS — I10 ESSENTIAL HYPERTENSION: ICD-10-CM

## 2019-04-18 DIAGNOSIS — I10 ACCELERATED HYPERTENSION: Primary | ICD-10-CM

## 2019-04-18 DIAGNOSIS — E66.9 OBESITY (BMI 35.0-39.9 WITHOUT COMORBIDITY): ICD-10-CM

## 2019-04-18 DIAGNOSIS — Z13.220 ENCOUNTER FOR LIPID SCREENING FOR CARDIOVASCULAR DISEASE: ICD-10-CM

## 2019-04-18 DIAGNOSIS — E55.9 VITAMIN D DEFICIENCY: ICD-10-CM

## 2019-04-18 DIAGNOSIS — Z00.00 PREVENTATIVE HEALTH CARE: ICD-10-CM

## 2019-04-18 DIAGNOSIS — Z12.5 ENCOUNTER FOR PROSTATE CANCER SCREENING: ICD-10-CM

## 2019-04-18 DIAGNOSIS — E66.01 CLASS 3 SEVERE OBESITY DUE TO EXCESS CALORIES WITHOUT SERIOUS COMORBIDITY WITH BODY MASS INDEX (BMI) OF 40.0 TO 44.9 IN ADULT: ICD-10-CM

## 2019-04-18 PROBLEM — E66.813 CLASS 3 SEVERE OBESITY WITH BODY MASS INDEX (BMI) OF 40.0 TO 44.9 IN ADULT: Status: ACTIVE | Noted: 2018-05-22

## 2019-04-18 PROCEDURE — 99396 PREV VISIT EST AGE 40-64: CPT | Mod: ,,, | Performed by: FAMILY MEDICINE

## 2019-04-18 PROCEDURE — 3077F PR MOST RECENT SYSTOLIC BLOOD PRESSURE >= 140 MM HG: ICD-10-PCS | Mod: ,,, | Performed by: FAMILY MEDICINE

## 2019-04-18 PROCEDURE — 3080F PR MOST RECENT DIASTOLIC BLOOD PRESSURE >= 90 MM HG: ICD-10-PCS | Mod: ,,, | Performed by: FAMILY MEDICINE

## 2019-04-18 PROCEDURE — 99396 PR PREVENTIVE VISIT,EST,40-64: ICD-10-PCS | Mod: ,,, | Performed by: FAMILY MEDICINE

## 2019-04-18 PROCEDURE — 3080F DIAST BP >= 90 MM HG: CPT | Mod: ,,, | Performed by: FAMILY MEDICINE

## 2019-04-18 PROCEDURE — 3077F SYST BP >= 140 MM HG: CPT | Mod: ,,, | Performed by: FAMILY MEDICINE

## 2019-04-18 RX ORDER — TERAZOSIN 2 MG/1
2 CAPSULE ORAL NIGHTLY
Qty: 30 CAPSULE | Refills: 11 | Status: SHIPPED | OUTPATIENT
Start: 2019-04-18 | End: 2021-01-08

## 2019-04-18 RX ORDER — AMLODIPINE BESYLATE 10 MG/1
TABLET ORAL
Refills: 3 | COMMUNITY
Start: 2019-04-15 | End: 2021-01-11 | Stop reason: SDUPTHER

## 2019-04-18 RX ORDER — VALSARTAN AND HYDROCHLOROTHIAZIDE 160; 25 MG/1; MG/1
1 TABLET ORAL DAILY
Qty: 90 TABLET | Refills: 3 | Status: SHIPPED | OUTPATIENT
Start: 2019-04-18 | End: 2020-05-15

## 2019-04-18 RX ORDER — TERAZOSIN 1 MG/1
CAPSULE ORAL
Refills: 3 | COMMUNITY
Start: 2019-04-15 | End: 2019-04-18

## 2019-04-18 NOTE — PATIENT INSTRUCTIONS
Home blood pressure measurements is a large cuff or wrist cough.  My fitness spell 4-calorie logging

## 2019-04-18 NOTE — PROGRESS NOTES
Subjective:       Patient ID: Quentin Palacios is a 58 y.o. male.    Chief Complaint: Annual Exam      Patient is here for his annual well checkup today he has been seeing his cardiologist and is now on 4 different antihypertensives and is still elevated today 148/98.  Wt Readings from Last 3 Encounters:  04/18/19 : 129.4 kg (285 lb 4.8 oz)  04/03/19 : 126.6 kg (279 lb 1.6 oz)  01/18/19 : 124.3 kg (274 lb)             Allergies and Medications:   Review of patient's allergies indicates:  No Known Allergies  Current Outpatient Medications   Medication Sig Dispense Refill    amLODIPine (NORVASC) 10 MG tablet TK 1 T PO QD  3    cholecalciferol, vitamin D3, (VITAMIN D3) 1,000 unit capsule Take 1 capsule (1,000 Units total) by mouth once daily. 90 capsule 3    escitalopram oxalate (LEXAPRO) 10 MG tablet Take 1 tablet (10 mg total) by mouth once daily. 30 tablet 11    metoprolol succinate (TOPROL-XL) 25 MG 24 hr tablet Take 1 tablet (25 mg total) by mouth once daily. 30 tablet 11    valsartan-hydrochlorothiazide (DIOVAN-HCT) 160-25 mg per tablet Take 1 tablet by mouth once daily. 90 tablet 3    LORazepam (ATIVAN) 0.5 MG tablet Take 1 tablet (0.5 mg total) by mouth every 6 (six) hours as needed for Anxiety. 30 tablet 0    terazosin (HYTRIN) 2 MG capsule Take 1 capsule (2 mg total) by mouth every evening. 30 capsule 11     No current facility-administered medications for this visit.        Family History:   Family History   Problem Relation Age of Onset    Diabetes Mother     Heart failure Mother     Hypertension Mother     Diabetes Father     Hypertension Father     Diabetes Sister     Hypertension Brother     Cancer Maternal Grandfather        Social History:   Social History     Socioeconomic History    Marital status:      Spouse name: Not on file    Number of children: Not on file    Years of education: Not on file    Highest education level: Not on file   Occupational History    Not on file    Social Needs    Financial resource strain: Not on file    Food insecurity:     Worry: Not on file     Inability: Not on file    Transportation needs:     Medical: Not on file     Non-medical: Not on file   Tobacco Use    Smoking status: Never Smoker    Smokeless tobacco: Never Used   Substance and Sexual Activity    Alcohol use: Yes     Comment: occasionally    Drug use: No    Sexual activity: Yes   Lifestyle    Physical activity:     Days per week: Not on file     Minutes per session: Not on file    Stress: Not on file   Relationships    Social connections:     Talks on phone: Not on file     Gets together: Not on file     Attends [employer] service: Not on file     Active member of club or organization: Not on file     Attends meetings of clubs or organizations: Not on file     Relationship status: Not on file   Other Topics Concern    Not on file   Social History Narrative    Not on file       Review of Systems   Constitutional: Positive for unexpected weight change (increased 11 lb). Negative for activity change, appetite change, chills, diaphoresis, fatigue and fever.   HENT: Negative for congestion, dental problem, drooling, ear discharge, ear pain, facial swelling, hearing loss, mouth sores, nosebleeds, postnasal drip, rhinorrhea, sinus pressure, sinus pain, sneezing, sore throat, tinnitus, trouble swallowing and voice change.    Eyes: Negative for photophobia, pain, discharge, redness, itching and visual disturbance.   Respiratory: Negative for apnea, cough, choking, chest tightness, shortness of breath, wheezing and stridor.    Cardiovascular: Negative for chest pain, palpitations and leg swelling.   Gastrointestinal: Negative for abdominal distention, abdominal pain, anal bleeding, blood in stool, constipation, diarrhea, nausea, rectal pain and vomiting.   Endocrine: Negative for cold intolerance, heat intolerance, polydipsia, polyphagia and polyuria.   Genitourinary: Negative for decreased  urine volume, difficulty urinating, discharge, dysuria, enuresis, flank pain, frequency, genital sores, hematuria, penile pain, penile swelling, scrotal swelling, testicular pain and urgency.        One / night   Musculoskeletal: Positive for back pain (occ back pain). Negative for arthralgias, gait problem, joint swelling, myalgias, neck pain and neck stiffness.   Skin: Negative for color change, pallor, rash and wound.   Allergic/Immunologic: Negative for environmental allergies, food allergies and immunocompromised state.   Neurological: Negative for dizziness, tremors, seizures, syncope, facial asymmetry, speech difficulty, weakness, light-headedness, numbness and headaches.   Hematological: Negative for adenopathy. Does not bruise/bleed easily.   Psychiatric/Behavioral: Negative for agitation, behavioral problems, confusion, decreased concentration, dysphoric mood, hallucinations, self-injury, sleep disturbance and suicidal ideas. The patient is not nervous/anxious and is not hyperactive.        Objective:     Vitals:    04/18/19 1422   BP: (!) 148/102   Pulse:    Temp:         Physical Exam   Constitutional: He is oriented to person, place, and time. He appears well-developed and well-nourished.  Non-toxic appearance. He does not have a sickly appearance. He does not appear ill. No distress.   HENT:   Head: Normocephalic and atraumatic.   Right Ear: External ear normal.   Left Ear: External ear normal.   Nose: Nose normal.   Mouth/Throat: Oropharynx is clear and moist. No oropharyngeal exudate.   Eyes: Pupils are equal, round, and reactive to light. Conjunctivae and EOM are normal. Right eye exhibits no discharge. Left eye exhibits no discharge. No scleral icterus.   Neck: Normal range of motion. Neck supple. No JVD present. No tracheal deviation present. No thyromegaly present.   Cardiovascular: Normal rate, normal heart sounds and intact distal pulses. Exam reveals no gallop and no friction rub.   No murmur  heard.  Pulmonary/Chest: Effort normal and breath sounds normal. No stridor. No respiratory distress. He has no wheezes. He has no rales. He exhibits no tenderness.   Abdominal: Soft. Bowel sounds are normal. He exhibits no distension and no mass. There is no tenderness. There is no rebound and no guarding. No hernia.   Genitourinary: Rectum normal, prostate normal, testes normal and penis normal. Rectal exam shows guaiac negative stool. Cremasteric reflex is present. Right testis shows no mass, no swelling and no tenderness. Right testis is descended. Cremasteric reflex is not absent on the right side. Left testis shows no mass, no swelling and no tenderness. Left testis is descended. Cremasteric reflex is not absent on the left side. Circumcised. No phimosis, paraphimosis, hypospadias, penile erythema or penile tenderness. No discharge found.   Musculoskeletal: He exhibits no edema, tenderness or deformity.   Lymphadenopathy:     He has no cervical adenopathy.   Neurological: He is alert and oriented to person, place, and time. He has normal reflexes. He displays normal reflexes. No cranial nerve deficit or sensory deficit. He exhibits normal muscle tone. Coordination normal.   Skin: Skin is warm and dry. Capillary refill takes less than 2 seconds. No rash noted. He is not diaphoretic. No erythema. No pallor.   Psychiatric: He has a normal mood and affect. His behavior is normal. Judgment and thought content normal.   Nursing note and vitals reviewed.    previous labs from January reviews PSA was 1.3 previously 0.7 but still within normal range patient advised to recheck in one year.    Assessment:       1. Accelerated hypertension    2. Essential hypertension    3. Encounter for lipid screening for cardiovascular disease    4. Encounter for prostate cancer screening    5. Obesity (BMI 35.0-39.9 without comorbidity)    6. Class 3 severe obesity due to excess calories without serious comorbidity with body mass  index (BMI) of 40.0 to 44.9 in adult    7. Screening for colon cancer    8. Vitamin D deficiency    9. Preventative health care        Plan:       Quentin was seen today for annual exam.    Diagnoses and all orders for this visit:    Accelerated hypertension  -     terazosin (HYTRIN) 2 MG capsule; Take 1 capsule (2 mg total) by mouth every evening.  -     Polysomnogram (CPAP will be added if patient meets diagnostic criteria.); Future    Essential hypertension  -     valsartan-hydrochlorothiazide (DIOVAN-HCT) 160-25 mg per tablet; Take 1 tablet by mouth once daily.    Encounter for lipid screening for cardiovascular disease    Encounter for prostate cancer screening    Obesity (BMI 35.0-39.9 without comorbidity)    Class 3 severe obesity due to excess calories without serious comorbidity with body mass index (BMI) of 40.0 to 44.9 in adult    Screening for colon cancer  -     Ambulatory referral to Gastroenterology    Vitamin D deficiency  -     Vitamin D; Future  -     Vitamin D    Preventative health care         Follow up in about 1 month (around 5/18/2019) for follow up HTN, follow up cholesterol.

## 2019-04-22 PROBLEM — Z13.6 ENCOUNTER FOR LIPID SCREENING FOR CARDIOVASCULAR DISEASE: Status: RESOLVED | Noted: 2019-01-18 | Resolved: 2019-04-22

## 2019-04-22 PROBLEM — Z13.220 ENCOUNTER FOR LIPID SCREENING FOR CARDIOVASCULAR DISEASE: Status: RESOLVED | Noted: 2019-01-18 | Resolved: 2019-04-22

## 2019-04-22 PROBLEM — Z00.00 WELL ADULT EXAM: Status: RESOLVED | Noted: 2019-01-18 | Resolved: 2019-04-22

## 2019-07-02 ENCOUNTER — OFFICE VISIT (OUTPATIENT)
Dept: FAMILY MEDICINE | Facility: CLINIC | Age: 59
End: 2019-07-02
Payer: COMMERCIAL

## 2019-07-02 VITALS
HEIGHT: 70 IN | OXYGEN SATURATION: 97 % | TEMPERATURE: 98 F | BODY MASS INDEX: 39.58 KG/M2 | HEART RATE: 72 BPM | DIASTOLIC BLOOD PRESSURE: 94 MMHG | WEIGHT: 276.5 LBS | RESPIRATION RATE: 16 BRPM | SYSTOLIC BLOOD PRESSURE: 140 MMHG

## 2019-07-02 DIAGNOSIS — S01.81XA LACERATION OF SKIN OF FACE, INITIAL ENCOUNTER: Primary | ICD-10-CM

## 2019-07-02 DIAGNOSIS — I10 ACCELERATED HYPERTENSION: ICD-10-CM

## 2019-07-02 DIAGNOSIS — R55 VASOVAGAL SYNCOPE: ICD-10-CM

## 2019-07-02 PROCEDURE — 3080F PR MOST RECENT DIASTOLIC BLOOD PRESSURE >= 90 MM HG: ICD-10-PCS | Mod: ,,, | Performed by: FAMILY MEDICINE

## 2019-07-02 PROCEDURE — 99213 OFFICE O/P EST LOW 20 MIN: CPT | Mod: ,,, | Performed by: FAMILY MEDICINE

## 2019-07-02 PROCEDURE — 3008F PR BODY MASS INDEX (BMI) DOCUMENTED: ICD-10-PCS | Mod: ,,, | Performed by: FAMILY MEDICINE

## 2019-07-02 PROCEDURE — 3077F SYST BP >= 140 MM HG: CPT | Mod: ,,, | Performed by: FAMILY MEDICINE

## 2019-07-02 PROCEDURE — 3077F PR MOST RECENT SYSTOLIC BLOOD PRESSURE >= 140 MM HG: ICD-10-PCS | Mod: ,,, | Performed by: FAMILY MEDICINE

## 2019-07-02 PROCEDURE — 3080F DIAST BP >= 90 MM HG: CPT | Mod: ,,, | Performed by: FAMILY MEDICINE

## 2019-07-02 PROCEDURE — 3008F BODY MASS INDEX DOCD: CPT | Mod: ,,, | Performed by: FAMILY MEDICINE

## 2019-07-02 PROCEDURE — 99213 PR OFFICE/OUTPT VISIT, EST, LEVL III, 20-29 MIN: ICD-10-PCS | Mod: ,,, | Performed by: FAMILY MEDICINE

## 2019-07-02 NOTE — PROGRESS NOTES
Subjective:       Patient ID: Quentin Palacios is a 58 y.o. male.    Chief Complaint: Suture / Staple Removal (on forehead)      Patient is here as a follow-up from the ED he was seen there after he had a syncopal episode at home passing out and hitting the foot board in his room. He has only a slight lapse of consciousness but did suffer laceration went to the ED and had sutures placed in the upper lip and the 4 head here for suture removal 5 days out. He feels like he may have shave the suture on the left as there is no remaining suture in place.      Suture / Staple Removal   The sutures were placed 3 to 6 days ago. He tried nothing since the wound repair. The treatment provided mild relief. His temperature was unmeasured prior to arrival. There has been no drainage from the wound. There is no redness present. There is no swelling present. There is no pain present. He has no difficulty moving the affected extremity or digit.       Allergies and Medications:   Review of patient's allergies indicates:  No Known Allergies  Current Outpatient Medications   Medication Sig Dispense Refill    amLODIPine (NORVASC) 10 MG tablet TK 1 T PO QD  3    metoprolol succinate (TOPROL-XL) 25 MG 24 hr tablet Take 1 tablet (25 mg total) by mouth once daily. 30 tablet 11    terazosin (HYTRIN) 2 MG capsule Take 1 capsule (2 mg total) by mouth every evening. 30 capsule 11    valsartan-hydrochlorothiazide (DIOVAN-HCT) 160-25 mg per tablet Take 1 tablet by mouth once daily. 90 tablet 3    cholecalciferol, vitamin D3, (VITAMIN D3) 1,000 unit capsule Take 1 capsule (1,000 Units total) by mouth once daily. 90 capsule 3    escitalopram oxalate (LEXAPRO) 10 MG tablet Take 1 tablet (10 mg total) by mouth once daily. 30 tablet 11    LORazepam (ATIVAN) 0.5 MG tablet Take 1 tablet (0.5 mg total) by mouth every 6 (six) hours as needed for Anxiety. 30 tablet 0     No current facility-administered medications for this visit.        Family  History:   Family History   Problem Relation Age of Onset    Diabetes Mother     Heart failure Mother     Hypertension Mother     Diabetes Father     Hypertension Father     Diabetes Sister     Hypertension Brother     Cancer Maternal Grandfather        Social History:   Social History     Socioeconomic History    Marital status:      Spouse name: Not on file    Number of children: Not on file    Years of education: Not on file    Highest education level: Not on file   Occupational History    Not on file   Social Needs    Financial resource strain: Not on file    Food insecurity:     Worry: Not on file     Inability: Not on file    Transportation needs:     Medical: Not on file     Non-medical: Not on file   Tobacco Use    Smoking status: Never Smoker    Smokeless tobacco: Never Used   Substance and Sexual Activity    Alcohol use: Yes     Comment: occasionally    Drug use: No    Sexual activity: Yes   Lifestyle    Physical activity:     Days per week: Not on file     Minutes per session: Not on file    Stress: Not on file   Relationships    Social connections:     Talks on phone: Not on file     Gets together: Not on file     Attends [employer] service: Not on file     Active member of club or organization: Not on file     Attends meetings of clubs or organizations: Not on file     Relationship status: Not on file   Other Topics Concern    Not on file   Social History Narrative    Not on file       Review of Systems    Objective:     Vitals:    07/02/19 1142   BP: (!) 140/94   Pulse: 72   Resp: 16   Temp: 98.2 °F (36.8 °C)        Physical Exam   HENT:   Head:           Assessment:       1. Accelerated hypertension patient is not taking his medicine for several days.    2. Laceration of skin of face, initial encounter -laceration is healing well has been Steri-Stripped as a precaution. He is not to wear his hard hat until he returns to work on Sunday.      in croupy this has occurred  after a complete cardiovascular workup  Plan:       Quentin was seen today for suture / staple removal.    Diagnoses and all orders for this visit:    Laceration of skin of face, initial encounter    Accelerated hypertension    Vasovagal syncope  -     Holter monitor - 24 hour; Future  -     Ambulatory consult to Cardiology         Follow up in about 3 months (around 10/2/2019).

## 2019-07-02 NOTE — LETTER
July 2, 2019      [facility]  [address]  [facility] [address]  Phone: [phone]  Fax: [phone]       Patient: Quentin Palacios   YOB: 1960  Date of Visit: 07/02/2019    To Whom It May Concern:    JOHN Palacios  was at [facility] on 07/02/2019. He has a head laceration and cannot wear her heart had until Sunday, July 7. He can return to work at that time. If you have any questions or concerns, or if I can be of further assistance, please do not hesitate to contact me.    Sincerely,    Oz Pérez MD

## 2019-07-02 NOTE — LETTER
July 2, 2019      [facility] / Internal Medicine  [address]  [facility] [address]  Phone: [phone]  Fax: [phone]       Patient: Quentin Palacios   YOB: 1960  Date of Visit: 07/02/2019    To Whom It May Concern:    JOHN Palacios  was at [facility] on 07/02/2019. He has a head laceration and cannot wear his hard hat until Sunday, July 7. He can return to work at that time. If you have any questions or concerns, or if I can be of further assistance, please do not hesitate to contact me.    Sincerely,    Oz Pérez MD

## 2020-02-04 ENCOUNTER — OFFICE VISIT (OUTPATIENT)
Dept: FAMILY MEDICINE | Facility: CLINIC | Age: 60
End: 2020-02-04
Payer: COMMERCIAL

## 2020-02-04 ENCOUNTER — TELEPHONE (OUTPATIENT)
Dept: FAMILY MEDICINE | Facility: CLINIC | Age: 60
End: 2020-02-04

## 2020-02-04 ENCOUNTER — HOSPITAL ENCOUNTER (OUTPATIENT)
Dept: RADIOLOGY | Facility: HOSPITAL | Age: 60
Discharge: HOME | End: 2020-02-04
Attending: FAMILY MEDICINE
Payer: COMMERCIAL

## 2020-02-04 VITALS
BODY MASS INDEX: 36.94 KG/M2 | OXYGEN SATURATION: 99 % | WEIGHT: 258 LBS | TEMPERATURE: 98 F | HEIGHT: 70 IN | DIASTOLIC BLOOD PRESSURE: 86 MMHG | HEART RATE: 72 BPM | SYSTOLIC BLOOD PRESSURE: 130 MMHG

## 2020-02-04 DIAGNOSIS — M25.551 RIGHT HIP PAIN: ICD-10-CM

## 2020-02-04 DIAGNOSIS — M25.551 RIGHT HIP PAIN: Primary | ICD-10-CM

## 2020-02-04 DIAGNOSIS — M70.61 TROCHANTERIC BURSITIS OF RIGHT HIP: ICD-10-CM

## 2020-02-04 PROCEDURE — 99213 OFFICE O/P EST LOW 20 MIN: CPT | Mod: 25,S$GLB,, | Performed by: FAMILY MEDICINE

## 2020-02-04 PROCEDURE — 3008F BODY MASS INDEX DOCD: CPT | Mod: S$GLB,,, | Performed by: FAMILY MEDICINE

## 2020-02-04 PROCEDURE — 3008F PR BODY MASS INDEX (BMI) DOCUMENTED: ICD-10-PCS | Mod: S$GLB,,, | Performed by: FAMILY MEDICINE

## 2020-02-04 PROCEDURE — 3079F PR MOST RECENT DIASTOLIC BLOOD PRESSURE 80-89 MM HG: ICD-10-PCS | Mod: S$GLB,,, | Performed by: FAMILY MEDICINE

## 2020-02-04 PROCEDURE — 96372 PR INJECTION,THERAP/PROPH/DIAG2ST, IM OR SUBCUT: ICD-10-PCS | Mod: S$GLB,,, | Performed by: FAMILY MEDICINE

## 2020-02-04 PROCEDURE — 96372 THER/PROPH/DIAG INJ SC/IM: CPT | Mod: S$GLB,,, | Performed by: FAMILY MEDICINE

## 2020-02-04 PROCEDURE — 73502 X-RAY EXAM HIP UNI 2-3 VIEWS: CPT | Mod: TC,PO,RT

## 2020-02-04 PROCEDURE — 3075F PR MOST RECENT SYSTOLIC BLOOD PRESS GE 130-139MM HG: ICD-10-PCS | Mod: S$GLB,,, | Performed by: FAMILY MEDICINE

## 2020-02-04 PROCEDURE — 99213 PR OFFICE/OUTPT VISIT, EST, LEVL III, 20-29 MIN: ICD-10-PCS | Mod: 25,S$GLB,, | Performed by: FAMILY MEDICINE

## 2020-02-04 PROCEDURE — 3075F SYST BP GE 130 - 139MM HG: CPT | Mod: S$GLB,,, | Performed by: FAMILY MEDICINE

## 2020-02-04 PROCEDURE — 3079F DIAST BP 80-89 MM HG: CPT | Mod: S$GLB,,, | Performed by: FAMILY MEDICINE

## 2020-02-04 RX ORDER — TRIAMCINOLONE ACETONIDE 40 MG/ML
40 INJECTION, SUSPENSION INTRA-ARTICULAR; INTRAMUSCULAR
Status: DISCONTINUED | OUTPATIENT
Start: 2020-02-04 | End: 2020-02-06 | Stop reason: HOSPADM

## 2020-02-04 RX ORDER — DICLOFENAC SODIUM 25 MG/1
25 TABLET, DELAYED RELEASE ORAL 3 TIMES DAILY
Qty: 90 TABLET | Refills: 2 | Status: SHIPPED | OUTPATIENT
Start: 2020-02-04 | End: 2020-05-04

## 2020-02-04 RX ADMIN — TRIAMCINOLONE ACETONIDE 40 MG: 40 INJECTION, SUSPENSION INTRA-ARTICULAR; INTRAMUSCULAR at 10:02

## 2020-02-04 NOTE — PROGRESS NOTES
Subjective:       Patient ID: Quentin Palacios is a 59 y.o. male.    Chief Complaint: Hip Pain (pain goes down right leg )      Patient is having increasing pain in the right hip laterally and posteriorly worsens with exercise.  It got significantly worse this weekend when he was helping his son moved furniture up and down stairs.  Some tightness in down the leg to the knee and slightly below but not down to the foot.  He did have bursitis in the left hip in 2017.    Hip Pain    The pain is present in the right hip. The quality of the pain is described as cramping and burning. The pain is at a severity of 8/10. The pain is moderate. The pain has been intermittent since onset. Associated symptoms include a loss of motion. Pertinent negatives include no inability to bear weight, loss of sensation, muscle weakness, numbness or tingling. The treatment provided mild relief.       Allergies and Medications:   Review of patient's allergies indicates:  No Known Allergies  Current Outpatient Medications   Medication Sig Dispense Refill    amLODIPine (NORVASC) 10 MG tablet TK 1 T PO QD  3    metoprolol succinate (TOPROL-XL) 25 MG 24 hr tablet Take 1 tablet (25 mg total) by mouth once daily. 30 tablet 11    valsartan-hydrochlorothiazide (DIOVAN-HCT) 160-25 mg per tablet Take 1 tablet by mouth once daily. 90 tablet 3    diclofenac (VOLTAREN) 25 MG TbEC Take 1 tablet (25 mg total) by mouth 3 (three) times daily. 90 tablet 2    escitalopram oxalate (LEXAPRO) 10 MG tablet Take 1 tablet (10 mg total) by mouth once daily. (Patient not taking: Reported on 2/4/2020) 30 tablet 11    LORazepam (ATIVAN) 0.5 MG tablet Take 1 tablet (0.5 mg total) by mouth every 6 (six) hours as needed for Anxiety. (Patient not taking: Reported on 2/4/2020) 30 tablet 0    terazosin (HYTRIN) 2 MG capsule Take 1 capsule (2 mg total) by mouth every evening. (Patient not taking: Reported on 2/4/2020) 30 capsule 11     Current Facility-Administered  Medications   Medication Dose Route Frequency Provider Last Rate Last Dose    triamcinolone acetonide injection 40 mg  40 mg Intra-articular  Oz Pérez MD   40 mg at 02/04/20 1023       Family History:   Family History   Problem Relation Age of Onset    Diabetes Mother     Heart failure Mother     Hypertension Mother     Diabetes Father     Hypertension Father     Diabetes Sister     Hypertension Brother     Cancer Maternal Grandfather        Social History:   Social History     Socioeconomic History    Marital status:      Spouse name: Not on file    Number of children: Not on file    Years of education: Not on file    Highest education level: Not on file   Occupational History    Not on file   Social Needs    Financial resource strain: Not on file    Food insecurity:     Worry: Not on file     Inability: Not on file    Transportation needs:     Medical: Not on file     Non-medical: Not on file   Tobacco Use    Smoking status: Never Smoker    Smokeless tobacco: Never Used   Substance and Sexual Activity    Alcohol use: Yes     Comment: occasionally    Drug use: No    Sexual activity: Yes   Lifestyle    Physical activity:     Days per week: Not on file     Minutes per session: Not on file    Stress: To some extent   Relationships    Social connections:     Talks on phone: Not on file     Gets together: Not on file     Attends [employer] service: Not on file     Active member of club or organization: Not on file     Attends meetings of clubs or organizations: Not on file     Relationship status: Not on file   Other Topics Concern    Not on file   Social History Narrative    Not on file       Review of Systems   Neurological: Negative for tingling and numbness.       Objective:     Vitals:    02/04/20 0944   BP: 130/86   Pulse: 72   Temp: 98.3 °F (36.8 °C)        Physical Exam   Musculoskeletal:        Back:        Assessment:       1. Right hip pain    2. Trochanteric bursitis  of right hip        Plan:       Quentin was seen today for hip pain.    Diagnoses and all orders for this visit:    Right hip pain  -     X-Ray Hip 2 or 3 views Right; Future  -     diclofenac (VOLTAREN) 25 MG TbEC; Take 1 tablet (25 mg total) by mouth 3 (three) times daily.  -     Ambulatory referral/consult to Physical/Occupational Therapy; Future  -     Trigger Point Injection  -     triamcinolone acetonide injection 40 mg    Trochanteric bursitis of right hip    Other orders  -     Cancel: Trigger Point Injection         Follow up if symptoms worsen or fail to improve.

## 2020-02-04 NOTE — PROCEDURES
Trigger Point Injection  Performed by: Oz Pérez MD  Authorized by: Oz Pérez MD     Pre-Procedure:     Indications:  Pain relief  Site marked: the procedure site was marked (Trochanteric bursa right hip)    Timeout: prior to procedure the correct patient, procedure, and site was verified        Prep: patient was prepped and draped in usual sterile fashion      Local anesthesia used?: No      Local anesthetic:  Lidocaine 1% without epinephrine    Anesthetic total (ml):  1   40 mg triamcinolone acetonide 40 mg/mL

## 2020-02-04 NOTE — TELEPHONE ENCOUNTER
Pt would like to speak directly with Dr. Pérez regarding pain in his leg. Pt was seen today, 2-4-20, for right hip pain.

## 2020-02-05 ENCOUNTER — TELEPHONE (OUTPATIENT)
Dept: FAMILY MEDICINE | Facility: CLINIC | Age: 60
End: 2020-02-05

## 2020-02-05 NOTE — TELEPHONE ENCOUNTER
Discussed with patient by telephone.  He has not gotten much relief with the steroid shot instruction to proceed with the physical therapy as ordered.

## 2020-02-20 ENCOUNTER — OFFICE VISIT (OUTPATIENT)
Dept: FAMILY MEDICINE | Facility: CLINIC | Age: 60
End: 2020-02-20
Payer: COMMERCIAL

## 2020-02-20 VITALS
DIASTOLIC BLOOD PRESSURE: 80 MMHG | OXYGEN SATURATION: 98 % | SYSTOLIC BLOOD PRESSURE: 134 MMHG | HEART RATE: 74 BPM | TEMPERATURE: 98 F | HEIGHT: 70 IN | BODY MASS INDEX: 36.79 KG/M2 | WEIGHT: 257 LBS

## 2020-02-20 DIAGNOSIS — Z00.00 PREVENTATIVE HEALTH CARE: ICD-10-CM

## 2020-02-20 DIAGNOSIS — M54.2 NECK PAIN: Primary | ICD-10-CM

## 2020-02-20 DIAGNOSIS — Z00.00 WELL ADULT EXAM: ICD-10-CM

## 2020-02-20 DIAGNOSIS — E55.9 VITAMIN D DEFICIENCY: ICD-10-CM

## 2020-02-20 DIAGNOSIS — Z28.21 IMMUNIZATION REFUSED: ICD-10-CM

## 2020-02-20 DIAGNOSIS — I10 ACCELERATED HYPERTENSION: ICD-10-CM

## 2020-02-20 PROCEDURE — 3008F BODY MASS INDEX DOCD: CPT | Mod: S$GLB,,, | Performed by: FAMILY MEDICINE

## 2020-02-20 PROCEDURE — 99213 OFFICE O/P EST LOW 20 MIN: CPT | Mod: S$GLB,,, | Performed by: FAMILY MEDICINE

## 2020-02-20 PROCEDURE — 3008F PR BODY MASS INDEX (BMI) DOCUMENTED: ICD-10-PCS | Mod: S$GLB,,, | Performed by: FAMILY MEDICINE

## 2020-02-20 PROCEDURE — 3079F PR MOST RECENT DIASTOLIC BLOOD PRESSURE 80-89 MM HG: ICD-10-PCS | Mod: S$GLB,,, | Performed by: FAMILY MEDICINE

## 2020-02-20 PROCEDURE — 3075F PR MOST RECENT SYSTOLIC BLOOD PRESS GE 130-139MM HG: ICD-10-PCS | Mod: S$GLB,,, | Performed by: FAMILY MEDICINE

## 2020-02-20 PROCEDURE — 99213 PR OFFICE/OUTPT VISIT, EST, LEVL III, 20-29 MIN: ICD-10-PCS | Mod: S$GLB,,, | Performed by: FAMILY MEDICINE

## 2020-02-20 PROCEDURE — 3075F SYST BP GE 130 - 139MM HG: CPT | Mod: S$GLB,,, | Performed by: FAMILY MEDICINE

## 2020-02-20 PROCEDURE — 3079F DIAST BP 80-89 MM HG: CPT | Mod: S$GLB,,, | Performed by: FAMILY MEDICINE

## 2020-02-20 NOTE — PROGRESS NOTES
Subjective:       Patient ID: Quentin Palacios is a 59 y.o. male.    Chief Complaint: Neck Pain (no injury )      Patient comes in because of a  painful or uncomfortable lump on the back of the neck.  Notice to lump after he had been having some neck pain for some time.  Approximately 6 months ago    Neck Pain    This is a new problem. The current episode started more than 1 month ago. The problem occurs constantly. The problem has been unchanged. Associated with: Flexion at the neck. The pain is present in the occipital region (Posterior neck and upper thoracic.). The pain is at a severity of 3/10. The pain is mild. Nothing aggravates the symptoms.       Allergies and Medications:   Review of patient's allergies indicates:  No Known Allergies  Current Outpatient Medications   Medication Sig Dispense Refill    amLODIPine (NORVASC) 10 MG tablet TK 1 T PO QD  3    escitalopram oxalate (LEXAPRO) 10 MG tablet Take 1 tablet (10 mg total) by mouth once daily. 30 tablet 11    metoprolol succinate (TOPROL-XL) 25 MG 24 hr tablet Take 1 tablet (25 mg total) by mouth once daily. 30 tablet 11    valsartan-hydrochlorothiazide (DIOVAN-HCT) 160-25 mg per tablet Take 1 tablet by mouth once daily. 90 tablet 3    diclofenac (VOLTAREN) 25 MG TbEC Take 1 tablet (25 mg total) by mouth 3 (three) times daily. (Patient not taking: Reported on 2/20/2020) 90 tablet 2    LORazepam (ATIVAN) 0.5 MG tablet Take 1 tablet (0.5 mg total) by mouth every 6 (six) hours as needed for Anxiety. 30 tablet 0    terazosin (HYTRIN) 2 MG capsule Take 1 capsule (2 mg total) by mouth every evening. (Patient not taking: Reported on 2/4/2020) 30 capsule 11     No current facility-administered medications for this visit.        Family History:   Family History   Problem Relation Age of Onset    Diabetes Mother     Heart failure Mother     Hypertension Mother     Diabetes Father     Hypertension Father     Diabetes Sister     Hypertension Brother      Cancer Maternal Grandfather        Social History:   Social History     Socioeconomic History    Marital status:      Spouse name: Not on file    Number of children: Not on file    Years of education: Not on file    Highest education level: Not on file   Occupational History    Not on file   Social Needs    Financial resource strain: Not on file    Food insecurity:     Worry: Not on file     Inability: Not on file    Transportation needs:     Medical: Not on file     Non-medical: Not on file   Tobacco Use    Smoking status: Never Smoker    Smokeless tobacco: Never Used   Substance and Sexual Activity    Alcohol use: Yes     Comment: occasionally    Drug use: No    Sexual activity: Yes   Lifestyle    Physical activity:     Days per week: Not on file     Minutes per session: Not on file    Stress: Not at all   Relationships    Social connections:     Talks on phone: Not on file     Gets together: Not on file     Attends [employer] service: Not on file     Active member of club or organization: Not on file     Attends meetings of clubs or organizations: Not on file     Relationship status: Not on file   Other Topics Concern    Not on file   Social History Narrative    Not on file       Review of Systems   Musculoskeletal: Positive for neck pain.       Objective:     Vitals:    02/20/20 1337   BP: 134/80   Pulse: 74   Temp: 98.1 °F (36.7 °C)        Physical Exam   Musculoskeletal:        Back:        Assessment:       1. Neck pain flat sebaceous cyst in that area with some adhesions to the neck.   2. Well adult exam    3. Accelerated hypertension blood pressure is stable and controlled   4. Vitamin D deficiency patient on vitamin-D replacement will recheck his vitamin-D level   5. Preventative health care    6. Immunization refused patient defers the shingles vaccine at this time.       Plan:       Quentin was seen today for neck pain.    Diagnoses and all orders for this visit:    Neck pain  -      US Soft Tissue Chest_Upper Back; Future    Well adult exam  -     Comprehensive metabolic panel; Future  -     HIV 1/2 Ag/Ab (4th Gen); Future  -     Lipid panel; Future  -     PSA, Screening; Future    Accelerated hypertension    Vitamin D deficiency  -     Vitamin D; Future    Preventative health care  -     HIV 1/2 Ag/Ab (4th Gen); Future    Immunization refused         Follow up in about 6 months (around 8/20/2020).

## 2020-04-22 DIAGNOSIS — M25.519 NECK AND SHOULDER PAIN: Primary | ICD-10-CM

## 2020-04-22 DIAGNOSIS — M54.2 NECK AND SHOULDER PAIN: Primary | ICD-10-CM

## 2020-05-04 ENCOUNTER — HOSPITAL ENCOUNTER (OUTPATIENT)
Dept: RADIOLOGY | Facility: HOSPITAL | Age: 60
Discharge: HOME | End: 2020-05-04
Attending: FAMILY MEDICINE
Payer: COMMERCIAL

## 2020-05-04 DIAGNOSIS — M54.2 NECK AND SHOULDER PAIN: ICD-10-CM

## 2020-05-04 DIAGNOSIS — M25.519 NECK AND SHOULDER PAIN: ICD-10-CM

## 2020-05-04 PROCEDURE — 76604 US EXAM CHEST: CPT | Mod: TC,PO

## 2020-05-15 DIAGNOSIS — R00.2 PALPITATIONS: ICD-10-CM

## 2020-05-15 DIAGNOSIS — I10 ESSENTIAL HYPERTENSION: ICD-10-CM

## 2020-05-15 DIAGNOSIS — R42 DIZZINESS: ICD-10-CM

## 2020-05-15 RX ORDER — VALSARTAN AND HYDROCHLOROTHIAZIDE 160; 25 MG/1; MG/1
TABLET ORAL
Qty: 90 TABLET | Refills: 1 | Status: SHIPPED | OUTPATIENT
Start: 2020-05-15 | End: 2021-01-11 | Stop reason: SDUPTHER

## 2020-05-15 RX ORDER — METOPROLOL SUCCINATE 25 MG/1
TABLET, EXTENDED RELEASE ORAL
Qty: 30 TABLET | Refills: 11 | Status: SHIPPED | OUTPATIENT
Start: 2020-05-15 | End: 2021-01-08

## 2020-05-25 PROBLEM — Z00.00 WELL ADULT EXAM: Status: RESOLVED | Noted: 2017-08-09 | Resolved: 2020-05-25

## 2020-05-25 PROBLEM — Z00.00 PREVENTATIVE HEALTH CARE: Status: RESOLVED | Noted: 2019-04-18 | Resolved: 2020-05-25

## 2021-01-08 ENCOUNTER — OFFICE VISIT (OUTPATIENT)
Dept: FAMILY MEDICINE | Facility: CLINIC | Age: 61
End: 2021-01-08
Payer: COMMERCIAL

## 2021-01-08 VITALS
HEART RATE: 90 BPM | BODY MASS INDEX: 37.18 KG/M2 | SYSTOLIC BLOOD PRESSURE: 134 MMHG | WEIGHT: 259.69 LBS | HEIGHT: 70 IN | OXYGEN SATURATION: 97 % | DIASTOLIC BLOOD PRESSURE: 78 MMHG | TEMPERATURE: 98 F

## 2021-01-08 DIAGNOSIS — Z00.01 ENCOUNTER FOR PREVENTATIVE ADULT HEALTH CARE EXAM WITH ABNORMAL FINDINGS: ICD-10-CM

## 2021-01-08 DIAGNOSIS — F41.9 ANXIETY: ICD-10-CM

## 2021-01-08 DIAGNOSIS — I10 ESSENTIAL HYPERTENSION: ICD-10-CM

## 2021-01-08 DIAGNOSIS — E66.01 CLASS 3 SEVERE OBESITY DUE TO EXCESS CALORIES WITHOUT SERIOUS COMORBIDITY WITH BODY MASS INDEX (BMI) OF 40.0 TO 44.9 IN ADULT: Primary | ICD-10-CM

## 2021-01-08 DIAGNOSIS — Z23 IMMUNIZATION DUE: ICD-10-CM

## 2021-01-08 DIAGNOSIS — F34.1 DYSTHYMIA: ICD-10-CM

## 2021-01-08 DIAGNOSIS — E55.9 VITAMIN D DEFICIENCY: ICD-10-CM

## 2021-01-08 PROCEDURE — 3008F PR BODY MASS INDEX (BMI) DOCUMENTED: ICD-10-PCS | Mod: S$GLB,,, | Performed by: FAMILY MEDICINE

## 2021-01-08 PROCEDURE — 3075F PR MOST RECENT SYSTOLIC BLOOD PRESS GE 130-139MM HG: ICD-10-PCS | Mod: S$GLB,,, | Performed by: FAMILY MEDICINE

## 2021-01-08 PROCEDURE — 99214 PR OFFICE/OUTPT VISIT, EST, LEVL IV, 30-39 MIN: ICD-10-PCS | Mod: S$GLB,,, | Performed by: FAMILY MEDICINE

## 2021-01-08 PROCEDURE — 3008F BODY MASS INDEX DOCD: CPT | Mod: S$GLB,,, | Performed by: FAMILY MEDICINE

## 2021-01-08 PROCEDURE — 99214 OFFICE O/P EST MOD 30 MIN: CPT | Mod: S$GLB,,, | Performed by: FAMILY MEDICINE

## 2021-01-08 PROCEDURE — 1125F AMNT PAIN NOTED PAIN PRSNT: CPT | Mod: S$GLB,,, | Performed by: FAMILY MEDICINE

## 2021-01-08 PROCEDURE — 3075F SYST BP GE 130 - 139MM HG: CPT | Mod: S$GLB,,, | Performed by: FAMILY MEDICINE

## 2021-01-08 PROCEDURE — 3078F DIAST BP <80 MM HG: CPT | Mod: S$GLB,,, | Performed by: FAMILY MEDICINE

## 2021-01-08 PROCEDURE — 3078F PR MOST RECENT DIASTOLIC BLOOD PRESSURE < 80 MM HG: ICD-10-PCS | Mod: S$GLB,,, | Performed by: FAMILY MEDICINE

## 2021-01-08 PROCEDURE — 1125F PR PAIN SEVERITY QUANTIFIED, PAIN PRESENT: ICD-10-PCS | Mod: S$GLB,,, | Performed by: FAMILY MEDICINE

## 2021-01-08 RX ORDER — ZOSTER VACCINE RECOMBINANT, ADJUVANTED 50 MCG/0.5
0.5 KIT INTRAMUSCULAR ONCE
Qty: 1 EACH | Refills: 0 | Status: SHIPPED | OUTPATIENT
Start: 2021-01-08 | End: 2021-01-08

## 2021-01-11 DIAGNOSIS — I10 ESSENTIAL HYPERTENSION: ICD-10-CM

## 2021-01-11 RX ORDER — VALSARTAN AND HYDROCHLOROTHIAZIDE 160; 25 MG/1; MG/1
1 TABLET ORAL DAILY
Qty: 90 TABLET | Refills: 1 | Status: SHIPPED | OUTPATIENT
Start: 2021-01-11 | End: 2021-05-27 | Stop reason: SDUPTHER

## 2021-01-11 RX ORDER — AMLODIPINE BESYLATE 10 MG/1
TABLET ORAL
Qty: 90 TABLET | Refills: 3 | Status: SHIPPED | OUTPATIENT
Start: 2021-01-11 | End: 2021-05-27 | Stop reason: SDUPTHER

## 2021-01-15 ENCOUNTER — LAB VISIT (OUTPATIENT)
Dept: LAB | Facility: HOSPITAL | Age: 61
End: 2021-01-15
Attending: FAMILY MEDICINE
Payer: COMMERCIAL

## 2021-01-15 DIAGNOSIS — Z00.00 WELL ADULT EXAM: ICD-10-CM

## 2021-01-15 DIAGNOSIS — E55.9 VITAMIN D DEFICIENCY: ICD-10-CM

## 2021-01-15 DIAGNOSIS — Z00.00 PREVENTATIVE HEALTH CARE: ICD-10-CM

## 2021-01-15 DIAGNOSIS — Z00.01 ENCOUNTER FOR GENERAL ADULT MEDICAL EXAMINATION WITH ABNORMAL FINDINGS: Primary | ICD-10-CM

## 2021-01-15 LAB
25(OH)D3+25(OH)D2 SERPL-MCNC: 27 NG/ML (ref 30–96)
ALBUMIN SERPL BCP-MCNC: 4.3 G/DL (ref 3.5–5.2)
ALP SERPL-CCNC: 54 U/L (ref 55–135)
ALT SERPL W/O P-5'-P-CCNC: 48 U/L (ref 10–44)
ANION GAP SERPL CALC-SCNC: 11 MMOL/L (ref 8–16)
AST SERPL-CCNC: 24 U/L (ref 10–40)
BILIRUB SERPL-MCNC: 1.8 MG/DL (ref 0.1–1)
BUN SERPL-MCNC: 22 MG/DL (ref 6–20)
CALCIUM SERPL-MCNC: 9.6 MG/DL (ref 8.7–10.5)
CHLORIDE SERPL-SCNC: 100 MMOL/L (ref 95–110)
CHOLEST SERPL-MCNC: 186 MG/DL (ref 120–199)
CHOLEST/HDLC SERPL: 4.3 {RATIO} (ref 2–5)
CO2 SERPL-SCNC: 26 MMOL/L (ref 23–29)
COMPLEXED PSA SERPL-MCNC: 0.62 NG/ML (ref 0–4)
CREAT SERPL-MCNC: 1.3 MG/DL (ref 0.5–1.4)
EST. GFR  (AFRICAN AMERICAN): >60 ML/MIN/1.73 M^2
EST. GFR  (NON AFRICAN AMERICAN): 59.3 ML/MIN/1.73 M^2
GLUCOSE SERPL-MCNC: 102 MG/DL (ref 70–110)
HDLC SERPL-MCNC: 43 MG/DL (ref 40–75)
HDLC SERPL: 23.1 % (ref 20–50)
LDLC SERPL CALC-MCNC: 126 MG/DL (ref 63–159)
NONHDLC SERPL-MCNC: 143 MG/DL
POTASSIUM SERPL-SCNC: 3.8 MMOL/L (ref 3.5–5.1)
PROT SERPL-MCNC: 8.5 G/DL (ref 6–8.4)
SODIUM SERPL-SCNC: 137 MMOL/L (ref 136–145)
TRIGL SERPL-MCNC: 85 MG/DL (ref 30–150)

## 2021-01-15 PROCEDURE — 82306 VITAMIN D 25 HYDROXY: CPT

## 2021-01-15 PROCEDURE — 87389 HIV-1 AG W/HIV-1&-2 AB AG IA: CPT

## 2021-01-15 PROCEDURE — 84153 ASSAY OF PSA TOTAL: CPT

## 2021-01-15 PROCEDURE — 80061 LIPID PANEL: CPT

## 2021-01-15 PROCEDURE — 80053 COMPREHEN METABOLIC PANEL: CPT

## 2021-01-15 PROCEDURE — 36415 COLL VENOUS BLD VENIPUNCTURE: CPT

## 2021-01-15 PROCEDURE — 84403 ASSAY OF TOTAL TESTOSTERONE: CPT

## 2021-01-15 RX ORDER — ERGOCALCIFEROL 1.25 MG/1
50000 CAPSULE ORAL
Qty: 4 CAPSULE | Refills: 11 | Status: SHIPPED | OUTPATIENT
Start: 2021-01-15 | End: 2022-01-15

## 2021-01-16 LAB
HIV 1+2 AB+HIV1 P24 AG SERPL QL IA: NON REACTIVE
TESTOST SERPL-MCNC: 202 NG/DL (ref 264–916)

## 2021-01-20 ENCOUNTER — TELEPHONE (OUTPATIENT)
Dept: FAMILY MEDICINE | Facility: CLINIC | Age: 61
End: 2021-01-20

## 2021-01-21 ENCOUNTER — OFFICE VISIT (OUTPATIENT)
Dept: FAMILY MEDICINE | Facility: CLINIC | Age: 61
End: 2021-01-21
Payer: COMMERCIAL

## 2021-01-21 VITALS
RESPIRATION RATE: 16 BRPM | DIASTOLIC BLOOD PRESSURE: 98 MMHG | SYSTOLIC BLOOD PRESSURE: 154 MMHG | HEART RATE: 88 BPM | OXYGEN SATURATION: 98 % | BODY MASS INDEX: 36.62 KG/M2 | HEIGHT: 70 IN | WEIGHT: 255.81 LBS | TEMPERATURE: 97 F

## 2021-01-21 DIAGNOSIS — M79.652 PAIN OF LEFT THIGH: Primary | ICD-10-CM

## 2021-01-21 PROCEDURE — 99214 OFFICE O/P EST MOD 30 MIN: CPT | Mod: 25,S$GLB,, | Performed by: FAMILY MEDICINE

## 2021-01-21 PROCEDURE — 96372 THER/PROPH/DIAG INJ SC/IM: CPT | Mod: S$GLB,,, | Performed by: FAMILY MEDICINE

## 2021-01-21 PROCEDURE — 1125F PR PAIN SEVERITY QUANTIFIED, PAIN PRESENT: ICD-10-PCS | Mod: S$GLB,,, | Performed by: FAMILY MEDICINE

## 2021-01-21 PROCEDURE — 3080F PR MOST RECENT DIASTOLIC BLOOD PRESSURE >= 90 MM HG: ICD-10-PCS | Mod: S$GLB,,, | Performed by: FAMILY MEDICINE

## 2021-01-21 PROCEDURE — 3008F BODY MASS INDEX DOCD: CPT | Mod: S$GLB,,, | Performed by: FAMILY MEDICINE

## 2021-01-21 PROCEDURE — 3077F SYST BP >= 140 MM HG: CPT | Mod: S$GLB,,, | Performed by: FAMILY MEDICINE

## 2021-01-21 PROCEDURE — 3077F PR MOST RECENT SYSTOLIC BLOOD PRESSURE >= 140 MM HG: ICD-10-PCS | Mod: S$GLB,,, | Performed by: FAMILY MEDICINE

## 2021-01-21 PROCEDURE — 3080F DIAST BP >= 90 MM HG: CPT | Mod: S$GLB,,, | Performed by: FAMILY MEDICINE

## 2021-01-21 PROCEDURE — 99214 PR OFFICE/OUTPT VISIT, EST, LEVL IV, 30-39 MIN: ICD-10-PCS | Mod: 25,S$GLB,, | Performed by: FAMILY MEDICINE

## 2021-01-21 PROCEDURE — 96372 PR INJECTION,THERAP/PROPH/DIAG2ST, IM OR SUBCUT: ICD-10-PCS | Mod: S$GLB,,, | Performed by: FAMILY MEDICINE

## 2021-01-21 PROCEDURE — 3008F PR BODY MASS INDEX (BMI) DOCUMENTED: ICD-10-PCS | Mod: S$GLB,,, | Performed by: FAMILY MEDICINE

## 2021-01-21 PROCEDURE — 1125F AMNT PAIN NOTED PAIN PRSNT: CPT | Mod: S$GLB,,, | Performed by: FAMILY MEDICINE

## 2021-01-21 RX ORDER — METHYLPREDNISOLONE ACETATE 80 MG/ML
80 INJECTION, SUSPENSION INTRA-ARTICULAR; INTRALESIONAL; INTRAMUSCULAR; SOFT TISSUE
Status: COMPLETED | OUTPATIENT
Start: 2021-01-21 | End: 2021-01-29

## 2021-01-21 RX ORDER — METHYLPREDNISOLONE ACETATE 80 MG/ML
40 INJECTION, SUSPENSION INTRA-ARTICULAR; INTRALESIONAL; INTRAMUSCULAR; SOFT TISSUE
Status: DISCONTINUED | OUTPATIENT
Start: 2021-01-21 | End: 2021-01-21

## 2021-01-21 RX ORDER — MELOXICAM 7.5 MG/1
7.5 TABLET ORAL DAILY
Qty: 30 TABLET | Refills: 0 | Status: SHIPPED | OUTPATIENT
Start: 2021-01-21 | End: 2022-02-27

## 2021-01-21 RX ORDER — METHYLPREDNISOLONE ACETATE 80 MG/ML
120 INJECTION, SUSPENSION INTRA-ARTICULAR; INTRALESIONAL; INTRAMUSCULAR; SOFT TISSUE ONCE
Status: DISCONTINUED | OUTPATIENT
Start: 2021-01-21 | End: 2021-01-21

## 2021-01-21 RX ADMIN — METHYLPREDNISOLONE ACETATE 40 MG: 80 INJECTION, SUSPENSION INTRA-ARTICULAR; INTRALESIONAL; INTRAMUSCULAR; SOFT TISSUE at 01:01

## 2021-01-22 ENCOUNTER — TELEPHONE (OUTPATIENT)
Dept: FAMILY MEDICINE | Facility: CLINIC | Age: 61
End: 2021-01-22

## 2021-01-22 ENCOUNTER — HOSPITAL ENCOUNTER (OUTPATIENT)
Dept: RADIOLOGY | Facility: HOSPITAL | Age: 61
Discharge: HOME | End: 2021-01-22
Attending: FAMILY MEDICINE
Payer: COMMERCIAL

## 2021-01-22 DIAGNOSIS — M79.652 PAIN OF LEFT THIGH: ICD-10-CM

## 2021-01-22 PROCEDURE — 73502 X-RAY EXAM HIP UNI 2-3 VIEWS: CPT | Mod: TC,PO,LT

## 2021-01-29 ENCOUNTER — OFFICE VISIT (OUTPATIENT)
Dept: FAMILY MEDICINE | Facility: CLINIC | Age: 61
End: 2021-01-29
Payer: COMMERCIAL

## 2021-01-29 VITALS
OXYGEN SATURATION: 98 % | DIASTOLIC BLOOD PRESSURE: 88 MMHG | TEMPERATURE: 98 F | SYSTOLIC BLOOD PRESSURE: 140 MMHG | HEART RATE: 78 BPM | WEIGHT: 257.38 LBS | HEIGHT: 70 IN | BODY MASS INDEX: 36.85 KG/M2

## 2021-01-29 DIAGNOSIS — M25.562 ACUTE PAIN OF LEFT KNEE: Primary | ICD-10-CM

## 2021-01-29 PROCEDURE — 1125F AMNT PAIN NOTED PAIN PRSNT: CPT | Mod: S$GLB,,, | Performed by: FAMILY MEDICINE

## 2021-01-29 PROCEDURE — 3008F BODY MASS INDEX DOCD: CPT | Mod: S$GLB,,, | Performed by: FAMILY MEDICINE

## 2021-01-29 PROCEDURE — 3079F PR MOST RECENT DIASTOLIC BLOOD PRESSURE 80-89 MM HG: ICD-10-PCS | Mod: S$GLB,,, | Performed by: FAMILY MEDICINE

## 2021-01-29 PROCEDURE — 99213 OFFICE O/P EST LOW 20 MIN: CPT | Mod: 25,S$GLB,, | Performed by: FAMILY MEDICINE

## 2021-01-29 PROCEDURE — 96372 PR INJECTION,THERAP/PROPH/DIAG2ST, IM OR SUBCUT: ICD-10-PCS | Mod: S$GLB,,, | Performed by: FAMILY MEDICINE

## 2021-01-29 PROCEDURE — 99213 PR OFFICE/OUTPT VISIT, EST, LEVL III, 20-29 MIN: ICD-10-PCS | Mod: 25,S$GLB,, | Performed by: FAMILY MEDICINE

## 2021-01-29 PROCEDURE — 96372 THER/PROPH/DIAG INJ SC/IM: CPT | Mod: S$GLB,,, | Performed by: FAMILY MEDICINE

## 2021-01-29 PROCEDURE — 3077F PR MOST RECENT SYSTOLIC BLOOD PRESSURE >= 140 MM HG: ICD-10-PCS | Mod: S$GLB,,, | Performed by: FAMILY MEDICINE

## 2021-01-29 PROCEDURE — 1125F PR PAIN SEVERITY QUANTIFIED, PAIN PRESENT: ICD-10-PCS | Mod: S$GLB,,, | Performed by: FAMILY MEDICINE

## 2021-01-29 PROCEDURE — 3077F SYST BP >= 140 MM HG: CPT | Mod: S$GLB,,, | Performed by: FAMILY MEDICINE

## 2021-01-29 PROCEDURE — 3008F PR BODY MASS INDEX (BMI) DOCUMENTED: ICD-10-PCS | Mod: S$GLB,,, | Performed by: FAMILY MEDICINE

## 2021-01-29 PROCEDURE — 3079F DIAST BP 80-89 MM HG: CPT | Mod: S$GLB,,, | Performed by: FAMILY MEDICINE

## 2021-01-29 RX ADMIN — METHYLPREDNISOLONE ACETATE 80 MG: 80 INJECTION, SUSPENSION INTRA-ARTICULAR; INTRALESIONAL; INTRAMUSCULAR; SOFT TISSUE at 11:01

## 2021-04-29 ENCOUNTER — PATIENT MESSAGE (OUTPATIENT)
Dept: RESEARCH | Facility: HOSPITAL | Age: 61
End: 2021-04-29

## 2021-05-27 ENCOUNTER — TELEPHONE (OUTPATIENT)
Dept: FAMILY MEDICINE | Facility: CLINIC | Age: 61
End: 2021-05-27

## 2021-05-27 DIAGNOSIS — I10 ESSENTIAL HYPERTENSION: ICD-10-CM

## 2021-05-27 RX ORDER — VALSARTAN AND HYDROCHLOROTHIAZIDE 160; 25 MG/1; MG/1
1 TABLET ORAL DAILY
Qty: 90 TABLET | Refills: 1 | Status: SHIPPED | OUTPATIENT
Start: 2021-05-27 | End: 2022-09-07

## 2021-05-27 RX ORDER — AMLODIPINE BESYLATE 10 MG/1
TABLET ORAL
Qty: 90 TABLET | Refills: 1 | Status: SHIPPED | OUTPATIENT
Start: 2021-05-27 | End: 2022-09-07

## 2021-06-15 ENCOUNTER — TELEPHONE (OUTPATIENT)
Dept: FAMILY MEDICINE | Facility: CLINIC | Age: 61
End: 2021-06-15

## 2021-07-01 ENCOUNTER — PATIENT MESSAGE (OUTPATIENT)
Dept: ADMINISTRATIVE | Facility: OTHER | Age: 61
End: 2021-07-01

## 2021-07-08 ENCOUNTER — OFFICE VISIT (OUTPATIENT)
Dept: FAMILY MEDICINE | Facility: CLINIC | Age: 61
End: 2021-07-08
Payer: COMMERCIAL

## 2021-07-08 VITALS
RESPIRATION RATE: 17 BRPM | HEIGHT: 70 IN | SYSTOLIC BLOOD PRESSURE: 126 MMHG | WEIGHT: 263.19 LBS | OXYGEN SATURATION: 95 % | TEMPERATURE: 99 F | BODY MASS INDEX: 37.68 KG/M2 | DIASTOLIC BLOOD PRESSURE: 81 MMHG | HEART RATE: 94 BPM

## 2021-07-08 DIAGNOSIS — E66.01 CLASS 3 SEVERE OBESITY DUE TO EXCESS CALORIES WITHOUT SERIOUS COMORBIDITY WITH BODY MASS INDEX (BMI) OF 40.0 TO 44.9 IN ADULT: ICD-10-CM

## 2021-07-08 DIAGNOSIS — E55.9 VITAMIN D DEFICIENCY: ICD-10-CM

## 2021-07-08 DIAGNOSIS — I10 ACCELERATED HYPERTENSION: ICD-10-CM

## 2021-07-08 DIAGNOSIS — I10 ESSENTIAL HYPERTENSION: Primary | ICD-10-CM

## 2021-07-08 DIAGNOSIS — E78.2 MIXED HYPERLIPIDEMIA: ICD-10-CM

## 2021-07-08 PROCEDURE — 1126F PR PAIN SEVERITY QUANTIFIED, NO PAIN PRESENT: ICD-10-PCS | Mod: S$GLB,,, | Performed by: FAMILY MEDICINE

## 2021-07-08 PROCEDURE — 99214 PR OFFICE/OUTPT VISIT, EST, LEVL IV, 30-39 MIN: ICD-10-PCS | Mod: S$GLB,,, | Performed by: FAMILY MEDICINE

## 2021-07-08 PROCEDURE — 3079F PR MOST RECENT DIASTOLIC BLOOD PRESSURE 80-89 MM HG: ICD-10-PCS | Mod: S$GLB,,, | Performed by: FAMILY MEDICINE

## 2021-07-08 PROCEDURE — 1126F AMNT PAIN NOTED NONE PRSNT: CPT | Mod: S$GLB,,, | Performed by: FAMILY MEDICINE

## 2021-07-08 PROCEDURE — 3008F BODY MASS INDEX DOCD: CPT | Mod: S$GLB,,, | Performed by: FAMILY MEDICINE

## 2021-07-08 PROCEDURE — 3008F PR BODY MASS INDEX (BMI) DOCUMENTED: ICD-10-PCS | Mod: S$GLB,,, | Performed by: FAMILY MEDICINE

## 2021-07-08 PROCEDURE — 3074F PR MOST RECENT SYSTOLIC BLOOD PRESSURE < 130 MM HG: ICD-10-PCS | Mod: S$GLB,,, | Performed by: FAMILY MEDICINE

## 2021-07-08 PROCEDURE — 3074F SYST BP LT 130 MM HG: CPT | Mod: S$GLB,,, | Performed by: FAMILY MEDICINE

## 2021-07-08 PROCEDURE — 99214 OFFICE O/P EST MOD 30 MIN: CPT | Mod: S$GLB,,, | Performed by: FAMILY MEDICINE

## 2021-07-08 PROCEDURE — 3079F DIAST BP 80-89 MM HG: CPT | Mod: S$GLB,,, | Performed by: FAMILY MEDICINE

## 2021-07-22 ENCOUNTER — LAB VISIT (OUTPATIENT)
Dept: LAB | Facility: HOSPITAL | Age: 61
End: 2021-07-22
Attending: FAMILY MEDICINE
Payer: COMMERCIAL

## 2021-07-22 DIAGNOSIS — I10 ESSENTIAL HYPERTENSION: ICD-10-CM

## 2021-07-22 DIAGNOSIS — E78.2 MIXED HYPERLIPIDEMIA: ICD-10-CM

## 2021-07-22 DIAGNOSIS — E55.9 VITAMIN D DEFICIENCY: ICD-10-CM

## 2021-07-22 LAB
25(OH)D3+25(OH)D2 SERPL-MCNC: 38 NG/ML (ref 30–96)
ALBUMIN SERPL BCP-MCNC: 3.8 G/DL (ref 3.5–5.2)
ALP SERPL-CCNC: 49 U/L (ref 55–135)
ALT SERPL W/O P-5'-P-CCNC: 45 U/L (ref 10–44)
ANION GAP SERPL CALC-SCNC: 8 MMOL/L (ref 8–16)
AST SERPL-CCNC: 23 U/L (ref 10–40)
BILIRUB SERPL-MCNC: 1.2 MG/DL (ref 0.1–1)
BUN SERPL-MCNC: 19 MG/DL (ref 6–20)
CALCIUM SERPL-MCNC: 8.8 MG/DL (ref 8.7–10.5)
CHLORIDE SERPL-SCNC: 103 MMOL/L (ref 95–110)
CHOLEST SERPL-MCNC: 172 MG/DL (ref 120–199)
CHOLEST/HDLC SERPL: 4.2 {RATIO} (ref 2–5)
CO2 SERPL-SCNC: 26 MMOL/L (ref 23–29)
CREAT SERPL-MCNC: 1.3 MG/DL (ref 0.5–1.4)
EST. GFR  (AFRICAN AMERICAN): >60 ML/MIN/1.73 M^2
EST. GFR  (NON AFRICAN AMERICAN): 59.3 ML/MIN/1.73 M^2
GLUCOSE SERPL-MCNC: 102 MG/DL (ref 70–110)
HDLC SERPL-MCNC: 41 MG/DL (ref 40–75)
HDLC SERPL: 23.8 % (ref 20–50)
LDLC SERPL CALC-MCNC: 119.8 MG/DL (ref 63–159)
NONHDLC SERPL-MCNC: 131 MG/DL
POTASSIUM SERPL-SCNC: 4.4 MMOL/L (ref 3.5–5.1)
PROT SERPL-MCNC: 7.2 G/DL (ref 6–8.4)
SODIUM SERPL-SCNC: 137 MMOL/L (ref 136–145)
TRIGL SERPL-MCNC: 56 MG/DL (ref 30–150)

## 2021-07-22 PROCEDURE — 80053 COMPREHEN METABOLIC PANEL: CPT | Performed by: FAMILY MEDICINE

## 2021-07-22 PROCEDURE — 36415 COLL VENOUS BLD VENIPUNCTURE: CPT | Performed by: FAMILY MEDICINE

## 2021-07-22 PROCEDURE — 80061 LIPID PANEL: CPT | Performed by: FAMILY MEDICINE

## 2021-07-22 PROCEDURE — 82306 VITAMIN D 25 HYDROXY: CPT | Performed by: FAMILY MEDICINE

## 2021-09-08 ENCOUNTER — TELEPHONE (OUTPATIENT)
Dept: FAMILY MEDICINE | Facility: CLINIC | Age: 61
End: 2021-09-08

## 2021-09-08 DIAGNOSIS — N52.9 ERECTILE DYSFUNCTION, UNSPECIFIED ERECTILE DYSFUNCTION TYPE: Primary | ICD-10-CM

## 2021-09-08 RX ORDER — SILDENAFIL 100 MG/1
100 TABLET, FILM COATED ORAL DAILY PRN
Qty: 12 TABLET | Refills: 5 | Status: SHIPPED | OUTPATIENT
Start: 2021-09-08 | End: 2022-03-09 | Stop reason: SDUPTHER

## 2021-12-29 ENCOUNTER — OFFICE VISIT (OUTPATIENT)
Dept: URGENT CARE | Facility: CLINIC | Age: 61
End: 2021-12-29
Payer: COMMERCIAL

## 2021-12-29 VITALS
HEART RATE: 85 BPM | OXYGEN SATURATION: 96 % | WEIGHT: 257.63 LBS | DIASTOLIC BLOOD PRESSURE: 101 MMHG | SYSTOLIC BLOOD PRESSURE: 156 MMHG | BODY MASS INDEX: 36.88 KG/M2 | RESPIRATION RATE: 18 BRPM | HEIGHT: 70 IN | TEMPERATURE: 98 F

## 2021-12-29 DIAGNOSIS — J32.9 SINUSITIS, UNSPECIFIED CHRONICITY, UNSPECIFIED LOCATION: ICD-10-CM

## 2021-12-29 DIAGNOSIS — Z20.822 EXPOSURE TO COVID-19 VIRUS: Primary | ICD-10-CM

## 2021-12-29 LAB
CTP QC/QA: YES
SARS-COV-2 AG RESP QL IA.RAPID: NEGATIVE

## 2021-12-29 PROCEDURE — 1160F RVW MEDS BY RX/DR IN RCRD: CPT | Mod: CPTII,S$GLB,, | Performed by: NURSE PRACTITIONER

## 2021-12-29 PROCEDURE — 99204 OFFICE O/P NEW MOD 45 MIN: CPT | Mod: S$GLB,,, | Performed by: NURSE PRACTITIONER

## 2021-12-29 PROCEDURE — 87811 SARS-COV-2 COVID19 W/OPTIC: CPT | Mod: S$GLB,,, | Performed by: NURSE PRACTITIONER

## 2021-12-29 PROCEDURE — 3080F DIAST BP >= 90 MM HG: CPT | Mod: CPTII,S$GLB,, | Performed by: NURSE PRACTITIONER

## 2021-12-29 PROCEDURE — 3077F PR MOST RECENT SYSTOLIC BLOOD PRESSURE >= 140 MM HG: ICD-10-PCS | Mod: CPTII,S$GLB,, | Performed by: NURSE PRACTITIONER

## 2021-12-29 PROCEDURE — 87811 SARS CORONAVIRUS 2 ANTIGEN POCT, MANUAL READ: ICD-10-PCS | Mod: S$GLB,,, | Performed by: NURSE PRACTITIONER

## 2021-12-29 PROCEDURE — 99204 PR OFFICE/OUTPT VISIT, NEW, LEVL IV, 45-59 MIN: ICD-10-PCS | Mod: S$GLB,,, | Performed by: NURSE PRACTITIONER

## 2021-12-29 PROCEDURE — 1159F PR MEDICATION LIST DOCUMENTED IN MEDICAL RECORD: ICD-10-PCS | Mod: CPTII,S$GLB,, | Performed by: NURSE PRACTITIONER

## 2021-12-29 PROCEDURE — 1160F PR REVIEW ALL MEDS BY PRESCRIBER/CLIN PHARMACIST DOCUMENTED: ICD-10-PCS | Mod: CPTII,S$GLB,, | Performed by: NURSE PRACTITIONER

## 2021-12-29 PROCEDURE — 3080F PR MOST RECENT DIASTOLIC BLOOD PRESSURE >= 90 MM HG: ICD-10-PCS | Mod: CPTII,S$GLB,, | Performed by: NURSE PRACTITIONER

## 2021-12-29 PROCEDURE — 3008F PR BODY MASS INDEX (BMI) DOCUMENTED: ICD-10-PCS | Mod: CPTII,S$GLB,, | Performed by: NURSE PRACTITIONER

## 2021-12-29 PROCEDURE — 3008F BODY MASS INDEX DOCD: CPT | Mod: CPTII,S$GLB,, | Performed by: NURSE PRACTITIONER

## 2021-12-29 PROCEDURE — 1159F MED LIST DOCD IN RCRD: CPT | Mod: CPTII,S$GLB,, | Performed by: NURSE PRACTITIONER

## 2021-12-29 PROCEDURE — 3077F SYST BP >= 140 MM HG: CPT | Mod: CPTII,S$GLB,, | Performed by: NURSE PRACTITIONER

## 2021-12-29 RX ORDER — AZELASTINE 1 MG/ML
1 SPRAY, METERED NASAL 2 TIMES DAILY
Qty: 30 ML | Refills: 0 | Status: SHIPPED | OUTPATIENT
Start: 2021-12-29 | End: 2023-07-28

## 2021-12-29 RX ORDER — FLUTICASONE PROPIONATE 50 MCG
1 SPRAY, SUSPENSION (ML) NASAL DAILY
Qty: 15.8 ML | Refills: 0 | Status: SHIPPED | OUTPATIENT
Start: 2021-12-29 | End: 2023-07-28 | Stop reason: SDUPTHER

## 2021-12-29 RX ORDER — CETIRIZINE HYDROCHLORIDE 10 MG/1
10 TABLET ORAL DAILY
Qty: 30 TABLET | Refills: 0 | Status: SHIPPED | OUTPATIENT
Start: 2021-12-29 | End: 2022-09-08

## 2021-12-29 NOTE — PATIENT INSTRUCTIONS
Below are suggestions for symptomatic relief of your upper respiratory symptoms:              -Emetrol for nausea: 15 to 30 mL; repeat dose every 15 minutes until distress subsides; do not take >5 doses in 1 hour.   -Salt water gargles to soothe throat pain.              -Chloroseptic spray also helps to numb throat pain.              -Nasal saline spray reduces inflammation and dryness.              -Warm face compresses to help with facial sinus pain/pressure.              -Vicks vapor rub at night.              -Flonase OTC or Nasacort OTC for nasal congestion and post-nasal drip              -Afrin is a nasal spray that can give immediate relief of nasal congestion but you cannot use this medication for more than 3 days              -Simple foods like chicken noodle soup.              -Mucinex for congestion or Mucinex DM for cough during the day time. Delsym helps with coughing at night. Mucinex-D if you have chest congestion or sputum (caution if history of high blood pressure or palpitations). You must increase your water intake when using expectorants (Mucinex).              -Zyrtec/Claritin/Allegra/Xyzal should help with allergies.  -If you DO NOT have Hypertension or any history of palpitations, it is ok to take over the counter Sudafed or Mucinex D or Allegra-D or Claritin-D or Zyrtec-D.  -If you do take one of the above, it is ok to combine that with plain over the counter Mucinex or Allegra or Claritin or Zyrtec. If, for example, you are taking Zyrtec -D, you can combine that with Mucinex, but not Mucinex-D.  If you are taking Mucinex-D, you can combine that with plain Allegra or Claritin or Zyrtec.   -If you DO have Hypertension or palpitations, it is safe to take Coricidin HBP for relief of sinus symptoms.

## 2021-12-29 NOTE — PROGRESS NOTES
"  Source of History:  Medical record, patient    Chief complaint:  Per triage note: "COVID-19 Concerns  "    HPI:    Quentin Palacios is a 61 y.o. male who presents requesting COVID-19 testing after exposure to COVID-19. He reports nasal congestion and cough the last two weeks.      Patient reports receiving 2 doses of COVID19 vaccination.   This is the extent of the patient's complaints at this time.     ROS:   As per HPI and below:   General: No fever.   HENT: No facial pain.    Eyes: No eye pain.   Cardiovascular: No chest pain.   Respiratory:  No dyspnea.   GI: No abdominal pain.   Skin: No rashes.   Neuro:  No syncope.  No focal deficits.   Musculoskeletal: No myalgias.  All other systems reviewed and are negative.      Review of patient's allergies indicates:  No Known Allergies    PMH:  As per HPI and below:  Past Medical History:   Diagnosis Date    Anxiety     Class 2 obesity without serious comorbidity with body mass index (BMI) of 39.0 to 39.9 in adult 5/22/2018    Depression     Hypertension        Past Surgical History:   Procedure Laterality Date    REFRACTIVE SURGERY      WISDOM TOOTH EXTRACTION         Social History     Tobacco Use    Smoking status: Never Smoker    Smokeless tobacco: Never Used   Substance Use Topics    Alcohol use: Yes     Comment: occasionally    Drug use: No       Physical Exam:      Nursing note and vitals reviewed.    BP (!) 156/101   Pulse 85   Temp 98.4 °F (36.9 °C) (Oral)   Resp 18   Ht 5' 10" (1.778 m)   Wt 116.8 kg (257 lb 9.6 oz)   SpO2 96%   BMI 36.96 kg/m²   Constitutional: AAOx3. No distress.   Eyes: EOMI. No discharge. Anicteric.  HENT:   Neck: Normal range of motion. Neck supple.  Cardiovascular: Normal rate.   Pulmonary/Chest: No respiratory distress. Effort normal.  No tachypnea.  Abdominal: No distension and no mass.   Musculoskeletal: Normal range of motion.   Neurological: GCS 15. Alert and oriented to person, place, and time. No gross cranial " nerve, light touch or strength deficit. Coordination normal.   Skin: Skin is warm and dry.   Psychiatric: Behavior is normal. Judgment normal.    SARS Coronavirus 2 Antigen, POCT Manual Read: negative      MDM:      VSS. POCT COVID-19 antigen testing negative. Counseled patient about the need for COVID-19 vaccination. Prescription sent to pharmacy for flonase, astelin, and zyrtec for symptomatic management of sinusitis. Discussed th at antibiotics are not indicated at this time. CDC guidelines for the prevention of COVID-19 discussed. Instructed to return to clinic for new or worsening symptoms. Verbalized understanding and all questions answered.        Diagnostic Impression:    1. Exposure to COVID-19 virus    2. Sinusitis, unspecified chronicity, unspecified location         Quentin was seen today for covid-19 concerns.    Diagnoses and all orders for this visit:    Exposure to COVID-19 virus  -     SARS Coronavirus 2 Antigen, POCT Manual Read    Sinusitis, unspecified chronicity, unspecified location    Other orders  -     fluticasone propionate (FLONASE) 50 mcg/actuation nasal spray; 1 spray (50 mcg total) by Each Nostril route once daily.  -     azelastine (ASTELIN) 137 mcg (0.1 %) nasal spray; 1 spray (137 mcg total) by Nasal route 2 (two) times daily.  -     cetirizine (ZYRTEC) 10 MG tablet; Take 1 tablet (10 mg total) by mouth once daily.

## 2022-01-10 ENCOUNTER — OFFICE VISIT (OUTPATIENT)
Dept: FAMILY MEDICINE | Facility: CLINIC | Age: 62
End: 2022-01-10
Payer: COMMERCIAL

## 2022-01-10 VITALS
BODY MASS INDEX: 36.14 KG/M2 | TEMPERATURE: 99 F | OXYGEN SATURATION: 96 % | RESPIRATION RATE: 16 BRPM | WEIGHT: 251.88 LBS | SYSTOLIC BLOOD PRESSURE: 162 MMHG | HEART RATE: 80 BPM | DIASTOLIC BLOOD PRESSURE: 102 MMHG

## 2022-01-10 DIAGNOSIS — J01.40 ACUTE NON-RECURRENT PANSINUSITIS: Primary | ICD-10-CM

## 2022-01-10 DIAGNOSIS — Z00.00 PREVENTATIVE HEALTH CARE: ICD-10-CM

## 2022-01-10 DIAGNOSIS — E78.2 MIXED HYPERLIPIDEMIA: ICD-10-CM

## 2022-01-10 DIAGNOSIS — U07.1 COVID: ICD-10-CM

## 2022-01-10 DIAGNOSIS — U07.1 COVID-19 VIRUS DETECTED: ICD-10-CM

## 2022-01-10 LAB
CTP QC/QA: YES
SARS-COV-2 RDRP RESP QL NAA+PROBE: POSITIVE

## 2022-01-10 PROCEDURE — U0002: ICD-10-PCS | Mod: QW,S$GLB,, | Performed by: FAMILY MEDICINE

## 2022-01-10 PROCEDURE — 99214 OFFICE O/P EST MOD 30 MIN: CPT | Mod: 25,S$GLB,, | Performed by: FAMILY MEDICINE

## 2022-01-10 PROCEDURE — 99214 PR OFFICE/OUTPT VISIT, EST, LEVL IV, 30-39 MIN: ICD-10-PCS | Mod: 25,S$GLB,, | Performed by: FAMILY MEDICINE

## 2022-01-10 PROCEDURE — 3008F PR BODY MASS INDEX (BMI) DOCUMENTED: ICD-10-PCS | Mod: S$GLB,,, | Performed by: FAMILY MEDICINE

## 2022-01-10 PROCEDURE — 3008F BODY MASS INDEX DOCD: CPT | Mod: S$GLB,,, | Performed by: FAMILY MEDICINE

## 2022-01-10 PROCEDURE — U0002 COVID-19 LAB TEST NON-CDC: HCPCS | Mod: QW,S$GLB,, | Performed by: FAMILY MEDICINE

## 2022-01-10 RX ORDER — AZITHROMYCIN 250 MG/1
250 TABLET, FILM COATED ORAL DAILY
Qty: 6 TABLET | Refills: 0 | Status: SHIPPED | OUTPATIENT
Start: 2022-01-10 | End: 2022-01-16

## 2022-01-10 RX ORDER — PROMETHAZINE HYDROCHLORIDE AND DEXTROMETHORPHAN HYDROBROMIDE 6.25; 15 MG/5ML; MG/5ML
5 SYRUP ORAL 4 TIMES DAILY
Qty: 200 ML | Refills: 0 | Status: SHIPPED | OUTPATIENT
Start: 2022-01-10 | End: 2022-01-20

## 2022-01-10 NOTE — PATIENT INSTRUCTIONS
COVID test is positive and your symptomatic:  [place] for 5 days and mask for another 5 days  Vitamin-D 5000 units per day  Vitamin-C 500 mg per day  Zinc 50 mg per day

## 2022-01-10 NOTE — LETTER
[address] ? [place], [address] ? Phone [phone] ? Fax [phone]           Return to Work/School    Patient: Quentin Palacios  YOB: 1960   Date: 01/10/2022      To Whom It May Concern:     Quentin Palacios was in contact with/seen in my office on 01/10/2022. COVID-19 is present in our communities across the [facility]. Not all patients are eligible or appropriate to be tested. In this situation, your employee meets the following criteria:     Quentin Palacios has met the criteria for COVID-19 testing and has a POSITIVE result. He can return to work once they are asymptomatic for 24 hours without the use of fever reducing medications AND at least five days from the diagnosis (or from the first positive result if they have no symptoms).      If you have any questions or concerns, or if I can be of further assistance, please do not hesitate to contact me.     Sincerely,      Oz Pérez MD

## 2022-01-10 NOTE — PROGRESS NOTES
Subjective:       Patient ID: Quentin Palacios is a 61 y.o. male.    Chief Complaint: Hypertension, Hyperlipidemia, Nasal Congestion (Since 12.20 and beyond/), and Fatigue      Patient is here for six-month follow-up on hypertension and cholesterol.  He does report that he was sick on December 20th with some upper respiratory symptoms was seen at urgent care.  And tested negative for COVID at that time.  He reports that the eye congestion and ear congestion persist.  COVID did go through his family.  Lab Results       Component                Value               Date                       WBC                      9.0                 01/18/2019                 HGB                      15.2                01/18/2019                 HCT                      45.0                01/18/2019                 PLT                      253                 01/18/2019                 CHOL                     172                 07/22/2021                 TRIG                     56                  07/22/2021                 HDL                      41                  07/22/2021                 ALT                      45 (H)              07/22/2021                 AST                      23                  07/22/2021                 NA                       137                 07/22/2021                 K                        4.4                 07/22/2021                 CL                       103                 07/22/2021                 CREATININE               1.3                 07/22/2021                 BUN                      19                  07/22/2021                 CO2                      26                  07/22/2021                 PSA                      0.62                01/15/2021                 HGBA1C                   5.7                 08/09/2017                Hypertension    Hyperlipidemia    Fatigue  Associated symptoms include fatigue.       Allergies and Medications:   Review of  patient's allergies indicates:  No Known Allergies  Current Outpatient Medications   Medication Sig Dispense Refill    amLODIPine (NORVASC) 10 MG tablet TK 1 T PO QD 90 tablet 1    ergocalciferol (ERGOCALCIFEROL) 50,000 unit Cap Take 1 capsule (50,000 Units total) by mouth every 7 days. 4 capsule 11    fluticasone propionate (FLONASE) 50 mcg/actuation nasal spray 1 spray (50 mcg total) by Each Nostril route once daily. 15.8 mL 0    sildenafiL (VIAGRA) 100 MG tablet Take 1 tablet (100 mg total) by mouth daily as needed for Erectile Dysfunction. 12 tablet 5    valsartan-hydrochlorothiazide (DIOVAN-HCT) 160-25 mg per tablet Take 1 tablet by mouth once daily. 90 tablet 1    azelastine (ASTELIN) 137 mcg (0.1 %) nasal spray 1 spray (137 mcg total) by Nasal route 2 (two) times daily. (Patient not taking: Reported on 1/10/2022) 30 mL 0    azithromycin (Z-MARISELA) 250 MG tablet Take 1 tablet (250 mg total) by mouth once daily. po on day 1 then 1 tab po on days 2-5 for 6 doses 6 tablet 0    cetirizine (ZYRTEC) 10 MG tablet Take 1 tablet (10 mg total) by mouth once daily. (Patient not taking: Reported on 1/10/2022) 30 tablet 0    escitalopram oxalate (LEXAPRO) 10 MG tablet Take 1 tablet (10 mg total) by mouth once daily. 30 tablet 11    promethazine-dextromethorphan (PROMETHAZINE-DM) 6.25-15 mg/5 mL Syrp Take 5 mLs by mouth 4 (four) times daily. for 10 days 200 mL 0     No current facility-administered medications for this visit.       Family History:   Family History   Problem Relation Age of Onset    Diabetes Mother     Heart failure Mother     Hypertension Mother     Diabetes Father     Hypertension Father     Diabetes Sister     Hypertension Brother     Cancer Maternal Grandfather        Social History:   Social History     Socioeconomic History    Marital status:    Tobacco Use    Smoking status: Never Smoker    Smokeless tobacco: Never Used   Substance and Sexual Activity    Alcohol use: Yes      Comment: occasionally    Drug use: No    Sexual activity: Yes       Review of Systems   Constitutional: Positive for fatigue.       Objective:     Vitals:    01/10/22 1349   BP: (!) 162/102   Pulse:    Resp:    Temp:         Physical Exam  Vitals and nursing note reviewed.   Constitutional:       General: He is not in acute distress.     Appearance: He is well-developed and well-nourished. He is not diaphoretic.   HENT:      Head: Normocephalic and atraumatic.      Right Ear: Hearing, tympanic membrane, ear canal and external ear normal. No decreased hearing noted. No drainage, swelling or tenderness. No middle ear effusion. There is no impacted cerumen. No foreign body. No hemotympanum. Tympanic membrane is not injected, scarred, perforated, erythematous, retracted or bulging. Tympanic membrane has normal mobility.      Left Ear: Hearing, tympanic membrane, ear canal and external ear normal. No decreased hearing noted. No drainage, swelling or tenderness.  No middle ear effusion. There is no impacted cerumen. No foreign body. No hemotympanum. Tympanic membrane is not injected, scarred, perforated, erythematous, retracted or bulging. Tympanic membrane has normal mobility.      Nose: Nose normal. No nasal deformity, septal deviation, laceration, sinus tenderness, mucosal edema, congestion or rhinorrhea.      Right Sinus: No maxillary sinus tenderness or frontal sinus tenderness.      Left Sinus: No maxillary sinus tenderness or frontal sinus tenderness.      Mouth/Throat:      Mouth: Oropharynx is clear and moist.      Dentition: Normal dentition.      Pharynx: Uvula midline. No oropharyngeal exudate, posterior oropharyngeal edema or posterior oropharyngeal erythema.      Tonsils: No tonsillar abscesses.   Eyes:      General: No scleral icterus.        Right eye: No discharge.         Left eye: No discharge.      Extraocular Movements: EOM normal.      Conjunctiva/sclera: Conjunctivae normal.      Pupils: Pupils  are equal, round, and reactive to light.   Neck:      Thyroid: No thyromegaly.   Cardiovascular:      Rate and Rhythm: Normal rate and regular rhythm.      Pulses: Intact distal pulses.      Heart sounds: Normal heart sounds. No murmur heard.  No friction rub. No gallop.    Pulmonary:      Effort: Pulmonary effort is normal. No respiratory distress.      Breath sounds: Normal breath sounds. No stridor. No wheezing, rhonchi or rales.   Chest:      Chest wall: No tenderness.   Musculoskeletal:      Cervical back: Normal range of motion and neck supple.   Lymphadenopathy:      Cervical: No cervical adenopathy.       p.o. CT COVID is positive.  Assessment:       1. Acute non-recurrent pansinusitis    2. Mixed hyperlipidemia    3. Preventative health care    4. COVID        Plan:       Quentin was seen today for hypertension, hyperlipidemia, nasal congestion and fatigue.    Diagnoses and all orders for this visit:    Acute non-recurrent pansinusitis  -     POCT COVID-19 Rapid Screening  -     promethazine-dextromethorphan (PROMETHAZINE-DM) 6.25-15 mg/5 mL Syrp; Take 5 mLs by mouth 4 (four) times daily. for 10 days  -     azithromycin (Z-MARISELA) 250 MG tablet; Take 1 tablet (250 mg total) by mouth once daily. po on day 1 then 1 tab po on days 2-5 for 6 doses    Mixed hyperlipidemia  -     Lipid Panel; Future  -     Comprehensive Metabolic Panel; Future    Preventative health care  -     HIV 1/2 Ag/Ab (4th Gen); Future    COVID  -     Ambulatory referral/consult to [facility]; Future         Follow up in about 2 weeks (around 1/24/2022) for follow up COVID.

## 2022-01-13 ENCOUNTER — INFUSION (OUTPATIENT)
Dept: INFECTIOUS DISEASES | Facility: HOSPITAL | Age: 62
End: 2022-01-13
Attending: FAMILY MEDICINE
Payer: COMMERCIAL

## 2022-01-13 VITALS
RESPIRATION RATE: 18 BRPM | OXYGEN SATURATION: 97 % | SYSTOLIC BLOOD PRESSURE: 129 MMHG | TEMPERATURE: 99 F | HEART RATE: 73 BPM | DIASTOLIC BLOOD PRESSURE: 66 MMHG

## 2022-01-13 DIAGNOSIS — U07.1 COVID: ICD-10-CM

## 2022-01-13 PROCEDURE — 63600175 PHARM REV CODE 636 W HCPCS: Performed by: FAMILY MEDICINE

## 2022-01-13 PROCEDURE — M0243 CASIRIVI AND IMDEVI INFUSION: HCPCS | Performed by: FAMILY MEDICINE

## 2022-01-13 PROCEDURE — 25000003 PHARM REV CODE 250: Performed by: FAMILY MEDICINE

## 2022-01-13 RX ORDER — ONDANSETRON 2 MG/ML
4 INJECTION INTRAMUSCULAR; INTRAVENOUS ONCE AS NEEDED
Status: DISCONTINUED | OUTPATIENT
Start: 2022-01-13 | End: 2023-07-28

## 2022-01-13 RX ORDER — ALBUTEROL SULFATE 90 UG/1
2 AEROSOL, METERED RESPIRATORY (INHALATION)
Status: DISCONTINUED | OUTPATIENT
Start: 2022-01-13 | End: 2022-09-08

## 2022-01-13 RX ORDER — SODIUM CHLORIDE 0.9 % (FLUSH) 0.9 %
10 SYRINGE (ML) INJECTION
Status: DISCONTINUED | OUTPATIENT
Start: 2022-01-13 | End: 2023-07-28

## 2022-01-13 RX ORDER — DIPHENHYDRAMINE HYDROCHLORIDE 50 MG/ML
25 INJECTION INTRAMUSCULAR; INTRAVENOUS ONCE AS NEEDED
Status: DISCONTINUED | OUTPATIENT
Start: 2022-01-13 | End: 2022-09-08

## 2022-01-13 RX ORDER — EPINEPHRINE 0.3 MG/.3ML
0.3 INJECTION SUBCUTANEOUS
Status: DISCONTINUED | OUTPATIENT
Start: 2022-01-13 | End: 2022-09-08

## 2022-01-13 RX ORDER — ACETAMINOPHEN 325 MG/1
650 TABLET ORAL ONCE AS NEEDED
Status: DISCONTINUED | OUTPATIENT
Start: 2022-01-13 | End: 2022-09-08

## 2022-01-13 RX ADMIN — SODIUM CHLORIDE: 0.9 INJECTION, SOLUTION INTRAVENOUS at 09:01

## 2022-01-13 RX ADMIN — CASIRIVIMAB AND IMDEVIMAB 600 MG: 600; 600 INJECTION, SOLUTION, CONCENTRATE INTRAVENOUS at 09:01

## 2022-01-13 NOTE — PLAN OF CARE
Pt arrived to clinic for Regeneron infusion and tolerated well with no changes throughout therapy after being monitored 1 hour post infusion. Pt given discharge instructions and handout and ambulated off unit in [facility].

## 2022-01-20 ENCOUNTER — LAB VISIT (OUTPATIENT)
Dept: LAB | Facility: HOSPITAL | Age: 62
End: 2022-01-20
Attending: FAMILY MEDICINE
Payer: COMMERCIAL

## 2022-01-20 DIAGNOSIS — Z00.00 PREVENTATIVE HEALTH CARE: ICD-10-CM

## 2022-01-20 DIAGNOSIS — E78.2 MIXED HYPERLIPIDEMIA: ICD-10-CM

## 2022-01-20 LAB
ALBUMIN SERPL BCP-MCNC: 4.1 G/DL (ref 3.5–5.2)
ALP SERPL-CCNC: 57 U/L (ref 55–135)
ALT SERPL W/O P-5'-P-CCNC: 33 U/L (ref 10–44)
ANION GAP SERPL CALC-SCNC: 7 MMOL/L (ref 8–16)
AST SERPL-CCNC: 20 U/L (ref 10–40)
BILIRUB SERPL-MCNC: 1.2 MG/DL (ref 0.1–1)
BUN SERPL-MCNC: 13 MG/DL (ref 8–23)
CALCIUM SERPL-MCNC: 9.2 MG/DL (ref 8.7–10.5)
CHLORIDE SERPL-SCNC: 105 MMOL/L (ref 95–110)
CHOLEST SERPL-MCNC: 191 MG/DL (ref 120–199)
CHOLEST/HDLC SERPL: 4.9 {RATIO} (ref 2–5)
CO2 SERPL-SCNC: 27 MMOL/L (ref 23–29)
CREAT SERPL-MCNC: 1.2 MG/DL (ref 0.5–1.4)
EST. GFR  (AFRICAN AMERICAN): >60 ML/MIN/1.73 M^2
EST. GFR  (NON AFRICAN AMERICAN): >60 ML/MIN/1.73 M^2
GLUCOSE SERPL-MCNC: 118 MG/DL (ref 70–110)
HDLC SERPL-MCNC: 39 MG/DL (ref 40–75)
HDLC SERPL: 20.4 % (ref 20–50)
LDLC SERPL CALC-MCNC: 133.4 MG/DL (ref 63–159)
NONHDLC SERPL-MCNC: 152 MG/DL
POTASSIUM SERPL-SCNC: 3.9 MMOL/L (ref 3.5–5.1)
PROT SERPL-MCNC: 8 G/DL (ref 6–8.4)
SODIUM SERPL-SCNC: 139 MMOL/L (ref 136–145)
TRIGL SERPL-MCNC: 93 MG/DL (ref 30–150)

## 2022-01-20 PROCEDURE — 80053 COMPREHEN METABOLIC PANEL: CPT | Performed by: FAMILY MEDICINE

## 2022-01-20 PROCEDURE — 87389 HIV-1 AG W/HIV-1&-2 AB AG IA: CPT | Performed by: FAMILY MEDICINE

## 2022-01-20 PROCEDURE — 80061 LIPID PANEL: CPT | Performed by: FAMILY MEDICINE

## 2022-01-20 NOTE — PROGRESS NOTES
Labs are okay except the glucose is 118 this is not compatible diabetes but suggests prediabetes or impaired glucose tolerance recommend a low carb sugar free diet and recheck in A1c in 6 months.

## 2022-01-21 LAB — HIV 1+2 AB+HIV1 P24 AG SERPL QL IA: NON REACTIVE

## 2022-01-26 ENCOUNTER — TELEPHONE (OUTPATIENT)
Dept: FAMILY MEDICINE | Facility: CLINIC | Age: 62
End: 2022-01-26
Payer: COMMERCIAL

## 2022-03-09 DIAGNOSIS — N52.9 ERECTILE DYSFUNCTION, UNSPECIFIED ERECTILE DYSFUNCTION TYPE: ICD-10-CM

## 2022-03-09 RX ORDER — SILDENAFIL 100 MG/1
100 TABLET, FILM COATED ORAL DAILY PRN
Qty: 36 TABLET | Refills: 1 | Status: SHIPPED | OUTPATIENT
Start: 2022-03-09 | End: 2022-09-16

## 2022-06-23 ENCOUNTER — TELEPHONE (OUTPATIENT)
Dept: FAMILY MEDICINE | Facility: CLINIC | Age: 62
End: 2022-06-23

## 2022-06-23 DIAGNOSIS — T75.3XXS: Primary | ICD-10-CM

## 2022-06-23 RX ORDER — SCOLOPAMINE TRANSDERMAL SYSTEM 1 MG/1
1 PATCH, EXTENDED RELEASE TRANSDERMAL
Qty: 3 PATCH | Refills: 0 | Status: SHIPPED | OUTPATIENT
Start: 2022-06-23 | End: 2022-09-08

## 2022-06-27 ENCOUNTER — TELEPHONE (OUTPATIENT)
Dept: FAMILY MEDICINE | Facility: CLINIC | Age: 62
End: 2022-06-27

## 2022-06-27 NOTE — TELEPHONE ENCOUNTER
Pt called in asking if rx was called in to pharmacy.     Pt notified rx called in and verbalized understanding.

## 2022-09-08 ENCOUNTER — OFFICE VISIT (OUTPATIENT)
Dept: FAMILY MEDICINE | Facility: CLINIC | Age: 62
End: 2022-09-08
Payer: COMMERCIAL

## 2022-09-08 VITALS
BODY MASS INDEX: 37.03 KG/M2 | RESPIRATION RATE: 18 BRPM | OXYGEN SATURATION: 98 % | WEIGHT: 258.69 LBS | TEMPERATURE: 98 F | HEART RATE: 74 BPM | DIASTOLIC BLOOD PRESSURE: 74 MMHG | HEIGHT: 70 IN | SYSTOLIC BLOOD PRESSURE: 128 MMHG

## 2022-09-08 DIAGNOSIS — U07.1 COVID: ICD-10-CM

## 2022-09-08 DIAGNOSIS — M54.50 ACUTE RIGHT-SIDED LOW BACK PAIN WITHOUT SCIATICA: Primary | ICD-10-CM

## 2022-09-08 PROCEDURE — 99214 OFFICE O/P EST MOD 30 MIN: CPT | Mod: S$GLB,,, | Performed by: FAMILY MEDICINE

## 2022-09-08 PROCEDURE — 99214 PR OFFICE/OUTPT VISIT, EST, LEVL IV, 30-39 MIN: ICD-10-PCS | Mod: S$GLB,,, | Performed by: FAMILY MEDICINE

## 2022-09-08 PROCEDURE — 3078F PR MOST RECENT DIASTOLIC BLOOD PRESSURE < 80 MM HG: ICD-10-PCS | Mod: CPTII,S$GLB,, | Performed by: FAMILY MEDICINE

## 2022-09-08 PROCEDURE — 3074F PR MOST RECENT SYSTOLIC BLOOD PRESSURE < 130 MM HG: ICD-10-PCS | Mod: CPTII,S$GLB,, | Performed by: FAMILY MEDICINE

## 2022-09-08 PROCEDURE — 3008F PR BODY MASS INDEX (BMI) DOCUMENTED: ICD-10-PCS | Mod: CPTII,S$GLB,, | Performed by: FAMILY MEDICINE

## 2022-09-08 PROCEDURE — 3074F SYST BP LT 130 MM HG: CPT | Mod: CPTII,S$GLB,, | Performed by: FAMILY MEDICINE

## 2022-09-08 PROCEDURE — 3008F BODY MASS INDEX DOCD: CPT | Mod: CPTII,S$GLB,, | Performed by: FAMILY MEDICINE

## 2022-09-08 PROCEDURE — 1159F MED LIST DOCD IN RCRD: CPT | Mod: CPTII,S$GLB,, | Performed by: FAMILY MEDICINE

## 2022-09-08 PROCEDURE — 1159F PR MEDICATION LIST DOCUMENTED IN MEDICAL RECORD: ICD-10-PCS | Mod: CPTII,S$GLB,, | Performed by: FAMILY MEDICINE

## 2022-09-08 PROCEDURE — 3078F DIAST BP <80 MM HG: CPT | Mod: CPTII,S$GLB,, | Performed by: FAMILY MEDICINE

## 2022-09-08 RX ORDER — CYCLOBENZAPRINE HCL 5 MG
5 TABLET ORAL 3 TIMES DAILY PRN
Qty: 30 TABLET | Refills: 3 | Status: SHIPPED | OUTPATIENT
Start: 2022-09-08 | End: 2022-10-08

## 2022-09-08 RX ORDER — DICLOFENAC SODIUM 10 MG/G
2 GEL TOPICAL 4 TIMES DAILY
Qty: 200 G | Refills: 5 | Status: SHIPPED | OUTPATIENT
Start: 2022-09-08 | End: 2023-07-28
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
## 2022-09-09 ENCOUNTER — TELEPHONE (OUTPATIENT)
Dept: FAMILY MEDICINE | Facility: CLINIC | Age: 62
End: 2022-09-09

## 2022-09-09 NOTE — TELEPHONE ENCOUNTER
Pharmacy is requesting a prior authorization for diclofenac gel. Would you like me to do the pa or have them purchase over the counter?

## 2022-09-12 ENCOUNTER — HOSPITAL ENCOUNTER (OUTPATIENT)
Dept: RADIOLOGY | Facility: HOSPITAL | Age: 62
Discharge: HOME | End: 2022-09-12
Attending: FAMILY MEDICINE
Payer: COMMERCIAL

## 2022-09-12 ENCOUNTER — TELEPHONE (OUTPATIENT)
Dept: FAMILY MEDICINE | Facility: CLINIC | Age: 62
End: 2022-09-12

## 2022-09-12 DIAGNOSIS — M54.50 ACUTE RIGHT-SIDED LOW BACK PAIN WITHOUT SCIATICA: ICD-10-CM

## 2022-09-12 PROCEDURE — 72110 X-RAY EXAM L-2 SPINE 4/>VWS: CPT | Mod: TC,PO

## 2022-09-12 PROCEDURE — 73502 X-RAY EXAM HIP UNI 2-3 VIEWS: CPT | Mod: TC,PO,RT

## 2023-05-04 DIAGNOSIS — I10 ESSENTIAL HYPERTENSION: ICD-10-CM

## 2023-05-04 RX ORDER — VALSARTAN AND HYDROCHLOROTHIAZIDE 160; 25 MG/1; MG/1
1 TABLET ORAL DAILY
Qty: 90 TABLET | Refills: 0 | Status: SHIPPED | OUTPATIENT
Start: 2023-05-04 | End: 2023-08-08

## 2023-05-04 RX ORDER — AMLODIPINE BESYLATE 10 MG/1
TABLET ORAL
Qty: 90 TABLET | Refills: 0 | Status: SHIPPED | OUTPATIENT
Start: 2023-05-04 | End: 2023-08-08

## 2023-06-07 ENCOUNTER — OFFICE VISIT (OUTPATIENT)
Dept: URGENT CARE | Facility: CLINIC | Age: 63
End: 2023-06-07
Payer: COMMERCIAL

## 2023-06-07 VITALS
TEMPERATURE: 98 F | OXYGEN SATURATION: 97 % | HEART RATE: 87 BPM | WEIGHT: 263 LBS | HEIGHT: 69 IN | DIASTOLIC BLOOD PRESSURE: 90 MMHG | RESPIRATION RATE: 20 BRPM | SYSTOLIC BLOOD PRESSURE: 154 MMHG | BODY MASS INDEX: 38.95 KG/M2

## 2023-06-07 DIAGNOSIS — Z11.3 SCREENING FOR STD (SEXUALLY TRANSMITTED DISEASE): Primary | ICD-10-CM

## 2023-06-07 LAB
BILIRUB UR QL STRIP: NEGATIVE
GLUCOSE UR QL STRIP: NEGATIVE
KETONES UR QL STRIP: NEGATIVE
LEUKOCYTE ESTERASE UR QL STRIP: NEGATIVE
PH, POC UA: 5.5
POC BLOOD, URINE: NEGATIVE
POC NITRATES, URINE: NEGATIVE
PROT UR QL STRIP: NEGATIVE
SP GR UR STRIP: 1.02 (ref 1–1.03)
UROBILINOGEN UR STRIP-ACNC: NORMAL (ref 0.3–2.2)

## 2023-06-07 PROCEDURE — 99213 OFFICE O/P EST LOW 20 MIN: CPT | Mod: S$GLB,,,

## 2023-06-07 PROCEDURE — 81003 URINALYSIS AUTO W/O SCOPE: CPT | Mod: QW,S$GLB,,

## 2023-06-07 PROCEDURE — 81003 POCT URINALYSIS, DIPSTICK, AUTOMATED, W/O SCOPE: ICD-10-PCS | Mod: QW,S$GLB,,

## 2023-06-07 PROCEDURE — 99213 PR OFFICE/OUTPT VISIT, EST, LEVL III, 20-29 MIN: ICD-10-PCS | Mod: S$GLB,,,

## 2023-06-07 NOTE — PATIENT INSTRUCTIONS
As discussed we will call you if your culture comes back positive.  Make sure we have a good contact number for you.  If you develop symptoms do not hesitate to be evaluated again or return.  Like discussed either abstinence or use some form of barrier protection such as a condom.

## 2023-06-07 NOTE — PROGRESS NOTES
"Subjective:      Patient ID: Quentin Palacios is a 62 y.o. male.    Vitals:  height is 5' 9" (1.753 m) and weight is 119.3 kg (263 lb). His temperature is 98.3 °F (36.8 °C). His blood pressure is 154/90 (abnormal) and his pulse is 87. His respiration is 20 and oxygen saturation is 97%.     Chief Complaint: STD CHECK    Pt states" requests STD check."  Mr. Miller presents stating that he has has multiple partners that he occasionally uses some form of barrier protection with.  He reports 1 of his partners had intercourse with another male and she sent him a message with concerns of Trichomonas.  Patient denies any urinary symptoms, testicular pain, or penile discharge.  He has been educated on abstinence or using condoms until results return.      Constitution: Negative.   HENT: Negative.     Neck: neck negative.   Cardiovascular: Negative.    Eyes: Negative.    Respiratory: Negative.     Gastrointestinal: Negative.    Endocrine: negative.   Genitourinary: Negative.    Musculoskeletal: Negative.    Skin: Negative.    Allergic/Immunologic: Negative.    Neurological: Negative.    Hematologic/Lymphatic: Negative.    Psychiatric/Behavioral: Negative.      Objective:     Physical Exam   Constitutional: He is oriented to person, place, and time. He appears well-developed. He is cooperative.  Non-toxic appearance. He does not appear ill. obesity  HENT:   Head: Normocephalic and atraumatic.   Ears:   Right Ear: Hearing, tympanic membrane, external ear and ear canal normal.   Left Ear: Hearing, tympanic membrane, external ear and ear canal normal.   Nose: Nose normal. No mucosal edema or nasal deformity. No epistaxis. Right sinus exhibits no maxillary sinus tenderness and no frontal sinus tenderness. Left sinus exhibits no maxillary sinus tenderness and no frontal sinus tenderness.   Mouth/Throat: Uvula is midline, oropharynx is clear and moist and mucous membranes are normal. Mucous membranes are moist. No trismus in the jaw. " Normal dentition. No uvula swelling.   Eyes: Conjunctivae and lids are normal. Pupils are equal, round, and reactive to light. Extraocular movement intact   Neck: Trachea normal and phonation normal. Neck supple.   Cardiovascular: Normal rate, regular rhythm, normal heart sounds and normal pulses.   Pulmonary/Chest: Effort normal and breath sounds normal.   Abdominal: Normal appearance. flat abdomen   Musculoskeletal: Normal range of motion.         General: Normal range of motion.   Neurological: no focal deficit. He is alert and oriented to person, place, and time. He exhibits normal muscle tone.   Skin: Skin is warm, dry, intact and not diaphoretic. Capillary refill takes 2 to 3 seconds.   Psychiatric: His speech is normal and behavior is normal. Judgment and thought content normal.   Nursing note and vitals reviewed.    Assessment:     1. Screening for STD (sexually transmitted disease)        Plan:       Screening for STD (sexually transmitted disease)  -     POCT Urinalysis, Dipstick, Automated, W/O Scope  -     CT/NG, T. vaginalis

## 2023-06-08 LAB
C TRACH RRNA SPEC QL NAA+PROBE: NEGATIVE
N GONORRHOEA RRNA SPEC QL NAA+PROBE: NEGATIVE
T VAGINALIS RRNA SPEC QL NAA+PROBE: NEGATIVE

## 2023-07-28 ENCOUNTER — OFFICE VISIT (OUTPATIENT)
Dept: FAMILY MEDICINE | Facility: CLINIC | Age: 63
End: 2023-07-28
Payer: COMMERCIAL

## 2023-07-28 VITALS
WEIGHT: 247 LBS | OXYGEN SATURATION: 95 % | HEART RATE: 84 BPM | HEIGHT: 69 IN | RESPIRATION RATE: 18 BRPM | TEMPERATURE: 99 F | DIASTOLIC BLOOD PRESSURE: 78 MMHG | SYSTOLIC BLOOD PRESSURE: 136 MMHG | BODY MASS INDEX: 36.58 KG/M2

## 2023-07-28 DIAGNOSIS — Z20.2 STD EXPOSURE: ICD-10-CM

## 2023-07-28 DIAGNOSIS — E66.01 CLASS 3 SEVERE OBESITY DUE TO EXCESS CALORIES WITHOUT SERIOUS COMORBIDITY WITH BODY MASS INDEX (BMI) OF 40.0 TO 44.9 IN ADULT: Primary | ICD-10-CM

## 2023-07-28 DIAGNOSIS — E78.2 MIXED HYPERLIPIDEMIA: ICD-10-CM

## 2023-07-28 DIAGNOSIS — Z23 IMMUNIZATION DUE: ICD-10-CM

## 2023-07-28 DIAGNOSIS — I10 PRIMARY HYPERTENSION: ICD-10-CM

## 2023-07-28 DIAGNOSIS — Z00.00 PREVENTATIVE HEALTH CARE: ICD-10-CM

## 2023-07-28 DIAGNOSIS — J30.1 SEASONAL ALLERGIC RHINITIS DUE TO POLLEN: ICD-10-CM

## 2023-07-28 PROCEDURE — 3008F BODY MASS INDEX DOCD: CPT | Mod: CPTII,S$GLB,, | Performed by: FAMILY MEDICINE

## 2023-07-28 PROCEDURE — 90715 TDAP VACCINE GREATER THAN OR EQUAL TO 7YO IM: ICD-10-PCS | Mod: S$GLB,,, | Performed by: FAMILY MEDICINE

## 2023-07-28 PROCEDURE — 90471 TDAP VACCINE GREATER THAN OR EQUAL TO 7YO IM: ICD-10-PCS | Mod: S$GLB,,, | Performed by: FAMILY MEDICINE

## 2023-07-28 PROCEDURE — 1159F PR MEDICATION LIST DOCUMENTED IN MEDICAL RECORD: ICD-10-PCS | Mod: CPTII,S$GLB,, | Performed by: FAMILY MEDICINE

## 2023-07-28 PROCEDURE — 3078F DIAST BP <80 MM HG: CPT | Mod: CPTII,S$GLB,, | Performed by: FAMILY MEDICINE

## 2023-07-28 PROCEDURE — 3008F PR BODY MASS INDEX (BMI) DOCUMENTED: ICD-10-PCS | Mod: CPTII,S$GLB,, | Performed by: FAMILY MEDICINE

## 2023-07-28 PROCEDURE — 99214 OFFICE O/P EST MOD 30 MIN: CPT | Mod: 25,S$GLB,, | Performed by: FAMILY MEDICINE

## 2023-07-28 PROCEDURE — 3078F PR MOST RECENT DIASTOLIC BLOOD PRESSURE < 80 MM HG: ICD-10-PCS | Mod: CPTII,S$GLB,, | Performed by: FAMILY MEDICINE

## 2023-07-28 PROCEDURE — 1159F MED LIST DOCD IN RCRD: CPT | Mod: CPTII,S$GLB,, | Performed by: FAMILY MEDICINE

## 2023-07-28 PROCEDURE — 1160F PR REVIEW ALL MEDS BY PRESCRIBER/CLIN PHARMACIST DOCUMENTED: ICD-10-PCS | Mod: CPTII,S$GLB,, | Performed by: FAMILY MEDICINE

## 2023-07-28 PROCEDURE — 3075F SYST BP GE 130 - 139MM HG: CPT | Mod: CPTII,S$GLB,, | Performed by: FAMILY MEDICINE

## 2023-07-28 PROCEDURE — 3075F PR MOST RECENT SYSTOLIC BLOOD PRESS GE 130-139MM HG: ICD-10-PCS | Mod: CPTII,S$GLB,, | Performed by: FAMILY MEDICINE

## 2023-07-28 PROCEDURE — 90715 TDAP VACCINE 7 YRS/> IM: CPT | Mod: S$GLB,,, | Performed by: FAMILY MEDICINE

## 2023-07-28 PROCEDURE — 90471 IMMUNIZATION ADMIN: CPT | Mod: S$GLB,,, | Performed by: FAMILY MEDICINE

## 2023-07-28 PROCEDURE — 99214 PR OFFICE/OUTPT VISIT, EST, LEVL IV, 30-39 MIN: ICD-10-PCS | Mod: 25,S$GLB,, | Performed by: FAMILY MEDICINE

## 2023-07-28 PROCEDURE — 1160F RVW MEDS BY RX/DR IN RCRD: CPT | Mod: CPTII,S$GLB,, | Performed by: FAMILY MEDICINE

## 2023-07-28 RX ORDER — METRONIDAZOLE 500 MG/1
1000 TABLET ORAL EVERY 12 HOURS
Qty: 4 TABLET | Refills: 0 | Status: SHIPPED | OUTPATIENT
Start: 2023-07-28 | End: 2023-07-29

## 2023-07-28 RX ORDER — FLUTICASONE PROPIONATE 50 MCG
1 SPRAY, SUSPENSION (ML) NASAL DAILY
Qty: 15.8 ML | Refills: 0 | Status: SHIPPED | OUTPATIENT
Start: 2023-07-28

## 2023-07-28 NOTE — PATIENT INSTRUCTIONS
We understand that controlling diabetes can feel overwhelming at times. We are here to offer the tools and support to help you manage your diabetes and meet your health goals. Please reference the meal planning guide below.     Diabetic Meal Planning    About this topic  Healthy eating is an important part of keeping your diabetes in control. A balanced diet along with your diabetic drugs will help you control your blood sugar. The right portions of healthy foods may help keep your sugar within your goal range. It may also help to lower or maintain your weight, manage cholesterol, the amount of fat in your blood, and blood pressure.      Image(s)    What will the results be?  Healthy eating may help you lower your blood sugar and keep it in a safe range. Keeping your blood sugar in a safe range may lower your chances for long term problems from your diabetes. You may be less likely to have damage to your kidneys, heart, eyes, or nerves.    What changes to diet are needed?  Healthy eating means:  Eating a range of foods from all food groups.  Eating the right size portions. Read the nutrition facts on each food you eat.  Eating meals and snacks at the same time each day. Do not skip a meal or snack. Skipping meals may cause your blood sugar to go too low, especially if you are on drugs for your diabetes. Skipping meals can also cause you to eat too much at the next meal.  Talk to your diabetes educator about making a personal meal plan for you. They can also help you eat the foods you like by modifying them to be healthier.    Who should use this diet?  This diet is helpful to people with high blood sugar or diabetes.    What foods are good to eat?  It is important to make a healthy meal. Eat a variety of different foods in the right portion.  Fill half of your plate with non-starchy vegetables. Non-starchy vegetables include: Broccoli, green beans, carrots, greens (rayne, kale, mustard, turnip), onions, tomatoes,  asparagus, beets, cauliflower, celery, and cucumber. Non-starchy vegetables are high in fiber and low in carbohydrates. These will help keep you full for longer without raising your blood sugar.  Fill 1/4 of your plate with carbohydrates. Try to choose whole grain options. Whole-grain products have more fiber which will make you feel full. Carbohydrates include: Bread, rice, pasta, and starchy vegetables. Starchy vegetables include beans, potatoes, acorn squash, butternut squash, corn, and peas.  Fill 1/4 of your plate with protein. Choose low-fat cuts of meat like boneless, skinless chicken breast; pork loin; 90% lean beef; lean and skinless turkey meat; and fresh fish (not fried) such as tuna, salmon, or mackerel.  Add a low-fat or non-fat dairy product like 8 ounces (240 mL) of 1% or skim milk or 6 ounces (180 mL) of low-fat yogurt. Eat the recommended serving. Milk and yogurt have carbohydrates which raise your blood sugar.  Add a small piece of fruit or 1/2 cup (120 grams) of frozen or canned fruit. Choose canned fruits in juice or water. Fruits include apples, bananas, peaches, pears, pineapple, plums, and oranges. Eat the recommended serving. Fruit has carbohydrates which can raise your blood sugar.  Include healthy fats in your meal like olive oil, canola oil, avocado, and nuts.  Other good foods to include in your diet are:  Foods high in fiber. Adding fiber to your meals may help control your blood sugar and cholesterol levels. It may also help with weight loss and prevent belly problems. If you are younger than 50, it is recommended to get 25 to 38 grams per day. If you are older than 50, it is recommended to get 21 to 30 grams per day. Good sources of fiber include:  Nuts and seeds  Beans, peas, and other legumes  Whole-grain products  Fruits  Vegetables  Smart snacks in the right portion. Do not go too long in between meals. Ask your dietitian when you should have a snack in between your meals. Snack on  things like:  Nuts  Vegetables and low-fat dressing  Light popcorn  Low-fat cheese and crackers  1/2 sandwich    What foods should be limited or avoided?  You may need to limit the amount of some foods you eat and how often you eat them. Foods that should be limited include:  High fat or processed foods like:  Perkins  Sausage  Hot dogs  Whole-fat dairy products  Potato chips  Foods high in sodium like:  Canned soups  Soy sauce and some salad dressings  Cured meats  Salted snack foods like pretzels  Olives  Fats and oils like:  Margarine  Salad dressing  Gravy  Lard or shortening  Foods high in sugar like:  Candy  Cookies  Cake  Ice cream  Table sugar  Soda  Juice drinks  Starches that are not whole grain like:  White rice  French fries  White pasta  White bread  Sugary cereals  Baked goods, pastries, croissants  Beer, wine, and mixed drinks (alcohol). Drink alcohol in moderation (1 drink per day or less for adult women and 2 drinks per day or less for adult men). Drinking alcohol can cause fluctuations in your blood sugar and interfere with how your diabetic drugs work. Talk to your doctor about how you can safely include alcohol into your diet.    What can be done to prevent this health problem?  Some people have a higher chance of developing diabetes than others. This is a life-long problem. You can still lead a normal life. Diabetes can be managed through diet, exercise, and drugs. It is important to have support from your family and friends to help you with your goals.    When do I need to call the doctor?  Blood sugar level is above 240 mg/dL for more than a day  Blood sugar level drops to less than 40 and does not respond to 15 grams of simple carbohydrate, like 4 glucose tablets or 1/2 cup (120 mL) of fruit juice  Trouble breathing  Very sleepy and trouble concentrating  Feeling very thirsty  Needing to urinate (pee) more than normal  Throw up more than once or have many loose stools  You are so sick you  cannot eat or drink  Fever over 101°F (38°C)  Questions about your diet plan  You are not feeling better in 2 to 3 days or you are feeling worse    Helpful tips  Plan ahead. Plan your meals and grocery list before going to the store. This will help you to choose foods that are good for you.  Pack a lunch. Take healthy meals and snacks with you to work.  Keep a food journal to help keep you on track. Take note of foods that keep your blood sugar in goal range. Also note foods and meals that raise or lower your blood sugar. There are apps for your phone that can help with this.  Visit a restaurant's website before eating out. You can see the menu options and nutritional facts. This way, you can see which items best fit into your diet plan. Call ahead if you have questions.    Disclaimer.This generalized information is a limited summary of diagnosis, treatment, and/or medication information. It is not meant to be comprehensive and should be used as a tool to help the user understand and/or assess potential diagnostic and treatment options. It does NOT include all information about conditions, treatments, medications, side effects, or risks that may apply to a specific patient. It is not intended to be medical advice or a substitute for the medical advice, diagnosis, or treatment of a health care provider based on the health care provider's examination and assessment of a patient's specific and unique circumstances. Patients must speak with a health care provider for complete information about their health, medical questions, and treatment options, including any risks or benefits regarding use of medications. This information does not endorse any treatments or medications as safe, effective, or approved for treating a specific patient. [employer] and its affiliates disclaim any warranty or liability relating to this information or the use thereof. The use of this information is governed by the Terms of Use,  available at Terms of Use. ©2022 [employer] and its affiliates and/or licensors. All rights reserved. Avoid anything with sugar in the 1st 3 ingredients including honey and syrup.  Avoid dried fruits like raise nodes and apricots.  Other fruits and vegetables are okay but the sugar content is higher in bananas and grapes.  Avoid large portions on your starchy foods:  Bread rice potatoes and pasta. Avoid anything with sugar in the 1st 3 ingredients including honey and syrup.  Avoid dried fruits like raise nodes and apricots.  Other fruits and vegetables are okay but the sugar content is higher in bananas and grapes.  Avoid large portions on your starchy foods:  Bread rice potatoes and pasta.

## 2023-07-28 NOTE — PROGRESS NOTES
Subjective:       Patient ID: Quentin Palacios is a 62 y.o. male.    Chief Complaint: STD CHECK      Patient is here for follow-up on hypertension.  He has gotten off of his Lexapro and anti-inflammatories and is only on his 2 blood pressure medicines he has been taking Flonase intermittently.  Lab Results       Component                Value               Date                       WBC                      9.0                 01/18/2019                 HGB                      15.2                01/18/2019                 HCT                      45.0                01/18/2019                 PLT                      253                 01/18/2019                 CHOL                     191                 01/20/2022                 TRIG                     93                  01/20/2022                 HDL                      39 (L)              01/20/2022                 ALT                      33                  01/20/2022                 AST                      20                  01/20/2022                 NA                       139                 01/20/2022                 K                        3.9                 01/20/2022                 CL                       105                 01/20/2022                 CREATININE               1.2                 01/20/2022                 BUN                      13                  01/20/2022                 CO2                      27                  01/20/2022                 PSA                      0.62                01/15/2021                 HGBA1C                   5.7                 08/09/2017            Patient has a history of sexual partner diagnosed with Trichomonas which.        Allergies and Medications:   Review of patient's allergies indicates:  No Known Allergies  Current Outpatient Medications   Medication Sig Dispense Refill    amLODIPine (NORVASC) 10 MG tablet TAKE 1 TABLET BY MOUTH EVERY DAY 90 tablet 0    sildenafiL (VIAGRA) 100  MG tablet TAKE 1 TABLET DAILY AS NEEDED FOR ERECTILE DYSFUNCTION (Patient taking differently: as needed.) 36 tablet 3    valsartan-hydrochlorothiazide (DIOVAN-HCT) 160-25 mg per tablet Take 1 tablet by mouth once daily. 90 tablet 0    fluticasone propionate (FLONASE) 50 mcg/actuation nasal spray 1 spray (50 mcg total) by Each Nostril route once daily. 15.8 mL 0    metroNIDAZOLE (FLAGYL) 500 MG tablet Take 2 tablets (1,000 mg total) by mouth every 12 (twelve) hours. for 1 day 4 tablet 0     No current facility-administered medications for this visit.       Family History:   Family History   Problem Relation Age of Onset    Diabetes Mother     Heart failure Mother     Hypertension Mother     Diabetes Father     Hypertension Father     Diabetes Sister     Hypertension Brother     Cancer Maternal Grandfather        Social History:   Social History     Socioeconomic History    Marital status:    Tobacco Use    Smoking status: Never    Smokeless tobacco: Never   Substance and Sexual Activity    Alcohol use: Yes     Comment: occasionally    Drug use: No    Sexual activity: Yes       Review of Systems   Constitutional:  Positive for unexpected weight change (Eleven 11 lb intentional weight loss).     Objective:     Vitals:    07/28/23 0817   BP: 136/78   Pulse: 84   Resp: 18   Temp: 98.7 °F (37.1 °C)        Physical Exam  Vitals and nursing note reviewed.   Constitutional:       General: He is not in acute distress.     Appearance: He is well-developed. He is not ill-appearing, toxic-appearing or diaphoretic.   HENT:      Head: Normocephalic.   Eyes:      Conjunctiva/sclera: Conjunctivae normal.      Pupils: Pupils are equal, round, and reactive to light.   Neck:      Vascular: No carotid bruit.   Cardiovascular:      Rate and Rhythm: Normal rate and regular rhythm.      Heart sounds: Normal heart sounds. No murmur heard.    No friction rub. No gallop.   Pulmonary:      Effort: Pulmonary effort is normal. No  respiratory distress.      Breath sounds: Normal breath sounds. No stridor. No wheezing, rhonchi or rales.   Chest:      Chest wall: No tenderness.   Musculoskeletal:      Cervical back: No rigidity or tenderness.   Lymphadenopathy:      Cervical: No cervical adenopathy.   Skin:     General: Skin is warm and dry.   Psychiatric:         Behavior: Behavior normal.         Thought Content: Thought content normal.         Judgment: Judgment normal.       Assessment:       1. Class 3 severe obesity due to excess calories without serious comorbidity with body mass index (BMI) of 40.0 to 44.9 in adult    2. Primary hypertension    3. Mixed hyperlipidemia    4. Immunization due    5. Seasonal allergic rhinitis due to pollen    6. STD exposure    7. Preventative health care        Plan:       Quentin was seen today for std check.    Diagnoses and all orders for this visit:    Class 3 severe obesity due to excess calories without serious comorbidity with body mass index (BMI) of 40.0 to 44.9 in adult    Primary hypertension    Mixed hyperlipidemia  -     Lipid Panel; Future  -     Comprehensive Metabolic Panel; Future    Immunization due  -     Tdap Vaccine    Seasonal allergic rhinitis due to pollen  -     fluticasone propionate (FLONASE) 50 mcg/actuation nasal spray; 1 spray (50 mcg total) by Each Nostril route once daily.    STD exposure  -     HIV 1/2 Ag/Ab (4th Gen); Future  -     Hepatitis C Antibody; Future  -     metroNIDAZOLE (FLAGYL) 500 MG tablet; Take 2 tablets (1,000 mg total) by mouth every 12 (twelve) hours. for 1 day    Preventative health care  -     Hemoglobin A1C; Future         Follow up in about 6 months (around 1/28/2024) for follow up cholesterol, follow up HTN, annual.

## 2023-08-02 ENCOUNTER — LAB VISIT (OUTPATIENT)
Dept: LAB | Facility: HOSPITAL | Age: 63
End: 2023-08-02
Attending: FAMILY MEDICINE
Payer: COMMERCIAL

## 2023-08-02 DIAGNOSIS — E78.2 MIXED HYPERLIPIDEMIA: ICD-10-CM

## 2023-08-02 DIAGNOSIS — Z00.00 PREVENTATIVE HEALTH CARE: ICD-10-CM

## 2023-08-02 DIAGNOSIS — Z20.2 STD EXPOSURE: ICD-10-CM

## 2023-08-02 LAB
ALBUMIN SERPL BCP-MCNC: 4 G/DL (ref 3.5–5.2)
ALP SERPL-CCNC: 49 U/L (ref 55–135)
ALT SERPL W/O P-5'-P-CCNC: 41 U/L (ref 10–44)
ANION GAP SERPL CALC-SCNC: 7 MMOL/L (ref 8–16)
AST SERPL-CCNC: 23 U/L (ref 10–40)
BILIRUB SERPL-MCNC: 0.8 MG/DL (ref 0.1–1)
BUN SERPL-MCNC: 17 MG/DL (ref 8–23)
CALCIUM SERPL-MCNC: 8.8 MG/DL (ref 8.7–10.5)
CHLORIDE SERPL-SCNC: 107 MMOL/L (ref 95–110)
CHOLEST SERPL-MCNC: 162 MG/DL (ref 120–199)
CHOLEST/HDLC SERPL: 4 {RATIO} (ref 2–5)
CO2 SERPL-SCNC: 29 MMOL/L (ref 23–29)
CREAT SERPL-MCNC: 1.3 MG/DL (ref 0.5–1.4)
EST. GFR  (NO RACE VARIABLE): >60 ML/MIN/1.73 M^2
ESTIMATED AVG GLUCOSE: 114 MG/DL (ref 68–131)
GLUCOSE SERPL-MCNC: 110 MG/DL (ref 70–110)
HBA1C MFR BLD: 5.6 % (ref 4.5–6.2)
HDLC SERPL-MCNC: 41 MG/DL (ref 40–75)
HDLC SERPL: 25.3 % (ref 20–50)
LDLC SERPL CALC-MCNC: 106.6 MG/DL (ref 63–159)
NONHDLC SERPL-MCNC: 121 MG/DL
POTASSIUM SERPL-SCNC: 3.5 MMOL/L (ref 3.5–5.1)
PROT SERPL-MCNC: 7.2 G/DL (ref 6–8.4)
SODIUM SERPL-SCNC: 143 MMOL/L (ref 136–145)
TRIGL SERPL-MCNC: 72 MG/DL (ref 30–150)

## 2023-08-02 PROCEDURE — 80053 COMPREHEN METABOLIC PANEL: CPT | Performed by: FAMILY MEDICINE

## 2023-08-02 PROCEDURE — 87389 HIV-1 AG W/HIV-1&-2 AB AG IA: CPT | Performed by: FAMILY MEDICINE

## 2023-08-02 PROCEDURE — 83036 HEMOGLOBIN GLYCOSYLATED A1C: CPT | Performed by: FAMILY MEDICINE

## 2023-08-02 PROCEDURE — 80061 LIPID PANEL: CPT | Performed by: FAMILY MEDICINE

## 2023-08-02 PROCEDURE — 86803 HEPATITIS C AB TEST: CPT | Performed by: FAMILY MEDICINE

## 2023-08-03 ENCOUNTER — TELEPHONE (OUTPATIENT)
Dept: FAMILY MEDICINE | Facility: CLINIC | Age: 63
End: 2023-08-03

## 2023-08-03 LAB
HCV AB S/CO SERPL IA: NON REACTIVE
HIV 1+2 AB+HIV1 P24 AG SERPL QL IA: NON REACTIVE

## 2023-08-03 NOTE — TELEPHONE ENCOUNTER
----- Message from Oz Pérez MD sent at 8/3/2023 11:22 AM CDT -----  Lab reviewed: all OK. Please notify pt. Continue pres meds.

## 2023-08-08 DIAGNOSIS — I10 ESSENTIAL HYPERTENSION: ICD-10-CM

## 2023-08-08 RX ORDER — AMLODIPINE BESYLATE 10 MG/1
TABLET ORAL
Qty: 90 TABLET | Refills: 1 | Status: SHIPPED | OUTPATIENT
Start: 2023-08-08

## 2023-08-08 RX ORDER — VALSARTAN AND HYDROCHLOROTHIAZIDE 160; 25 MG/1; MG/1
1 TABLET ORAL
Qty: 90 TABLET | Refills: 1 | Status: SHIPPED | OUTPATIENT
Start: 2023-08-08

## 2023-09-10 DIAGNOSIS — N52.9 ERECTILE DYSFUNCTION, UNSPECIFIED ERECTILE DYSFUNCTION TYPE: ICD-10-CM

## 2023-09-11 RX ORDER — SILDENAFIL 100 MG/1
100 TABLET, FILM COATED ORAL
Qty: 36 TABLET | Refills: 3 | Status: SHIPPED | OUTPATIENT
Start: 2023-09-11

## 2023-10-18 NOTE — TELEPHONE ENCOUNTER
----- Message from Oz Pérez MD sent at 1/21/2019  8:22 AM CST -----  Results Ok, notify patient.  
Notified patient  
dr haider

## 2024-03-22 ENCOUNTER — OFFICE VISIT (OUTPATIENT)
Dept: FAMILY MEDICINE | Facility: CLINIC | Age: 64
End: 2024-03-22
Payer: COMMERCIAL

## 2024-03-22 VITALS
RESPIRATION RATE: 18 BRPM | TEMPERATURE: 99 F | WEIGHT: 246 LBS | DIASTOLIC BLOOD PRESSURE: 90 MMHG | HEART RATE: 75 BPM | OXYGEN SATURATION: 99 % | HEIGHT: 69 IN | BODY MASS INDEX: 36.43 KG/M2 | SYSTOLIC BLOOD PRESSURE: 150 MMHG

## 2024-03-22 DIAGNOSIS — J30.1 ACUTE SEASONAL ALLERGIC RHINITIS DUE TO POLLEN: ICD-10-CM

## 2024-03-22 DIAGNOSIS — E78.2 MIXED HYPERLIPIDEMIA: ICD-10-CM

## 2024-03-22 DIAGNOSIS — I10 PRIMARY HYPERTENSION: Primary | ICD-10-CM

## 2024-03-22 DIAGNOSIS — E55.9 VITAMIN D DEFICIENCY: ICD-10-CM

## 2024-03-22 DIAGNOSIS — Z00.00 PREVENTATIVE HEALTH CARE: ICD-10-CM

## 2024-03-22 DIAGNOSIS — Z12.5 ENCOUNTER FOR PROSTATE CANCER SCREENING: ICD-10-CM

## 2024-03-22 PROCEDURE — 99396 PREV VISIT EST AGE 40-64: CPT | Mod: S$PBB,,, | Performed by: FAMILY MEDICINE

## 2024-03-22 PROCEDURE — 99999 PR PBB SHADOW E&M-EST. PATIENT-LVL IV: CPT | Mod: PBBFAC,,, | Performed by: FAMILY MEDICINE

## 2024-03-22 RX ORDER — ERGOCALCIFEROL 1.25 MG/1
50000 CAPSULE ORAL
Qty: 4 CAPSULE | Refills: 11 | Status: SHIPPED | OUTPATIENT
Start: 2024-03-22 | End: 2025-03-22

## 2024-03-22 RX ORDER — FLUTICASONE PROPIONATE 50 MCG
1 SPRAY, SUSPENSION (ML) NASAL DAILY
Qty: 16 G | Refills: 11 | Status: SHIPPED | OUTPATIENT
Start: 2024-03-22 | End: 2025-03-22
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
## 2024-04-05 ENCOUNTER — LAB VISIT (OUTPATIENT)
Dept: LAB | Facility: HOSPITAL | Age: 64
End: 2024-04-05
Attending: FAMILY MEDICINE
Payer: COMMERCIAL

## 2024-04-05 DIAGNOSIS — Z00.00 PREVENTATIVE HEALTH CARE: ICD-10-CM

## 2024-04-05 DIAGNOSIS — E78.2 MIXED HYPERLIPIDEMIA: ICD-10-CM

## 2024-04-05 DIAGNOSIS — Z12.5 ENCOUNTER FOR PROSTATE CANCER SCREENING: ICD-10-CM

## 2024-04-05 LAB
ALBUMIN SERPL BCP-MCNC: 4.1 G/DL (ref 3.5–5.2)
ALP SERPL-CCNC: 56 U/L (ref 55–135)
ALT SERPL W/O P-5'-P-CCNC: 23 U/L (ref 10–44)
ANION GAP SERPL CALC-SCNC: 2 MMOL/L (ref 8–16)
AST SERPL-CCNC: 15 U/L (ref 10–40)
BASOPHILS # BLD AUTO: 0.07 K/UL (ref 0–0.2)
BASOPHILS NFR BLD: 0.7 % (ref 0–1.9)
BILIRUB SERPL-MCNC: 1.3 MG/DL (ref 0.1–1)
BUN SERPL-MCNC: 18 MG/DL (ref 8–23)
CALCIUM SERPL-MCNC: 9.1 MG/DL (ref 8.7–10.5)
CHLORIDE SERPL-SCNC: 104 MMOL/L (ref 95–110)
CHOLEST SERPL-MCNC: 188 MG/DL (ref 120–199)
CHOLEST/HDLC SERPL: 4.4 {RATIO} (ref 2–5)
CO2 SERPL-SCNC: 30 MMOL/L (ref 23–29)
COMPLEXED PSA SERPL-MCNC: 0.6 NG/ML (ref 0–4)
CREAT SERPL-MCNC: 1.2 MG/DL (ref 0.5–1.4)
DIFFERENTIAL METHOD BLD: ABNORMAL
EOSINOPHIL # BLD AUTO: 0.2 K/UL (ref 0–0.5)
EOSINOPHIL NFR BLD: 1.7 % (ref 0–8)
ERYTHROCYTE [DISTWIDTH] IN BLOOD BY AUTOMATED COUNT: 12.8 % (ref 11.5–14.5)
EST. GFR  (NO RACE VARIABLE): >60 ML/MIN/1.73 M^2
GLUCOSE SERPL-MCNC: 105 MG/DL (ref 70–110)
HCT VFR BLD AUTO: 44.3 % (ref 40–54)
HDLC SERPL-MCNC: 43 MG/DL (ref 40–75)
HDLC SERPL: 22.9 % (ref 20–50)
HGB BLD-MCNC: 15.4 G/DL (ref 14–18)
IMM GRANULOCYTES # BLD AUTO: 0.05 K/UL (ref 0–0.04)
IMM GRANULOCYTES NFR BLD AUTO: 0.5 % (ref 0–0.5)
LDLC SERPL CALC-MCNC: 123.2 MG/DL (ref 63–159)
LYMPHOCYTES # BLD AUTO: 2 K/UL (ref 1–4.8)
LYMPHOCYTES NFR BLD: 19.7 % (ref 18–48)
MCH RBC QN AUTO: 32.3 PG (ref 27–31)
MCHC RBC AUTO-ENTMCNC: 34.8 G/DL (ref 32–36)
MCV RBC AUTO: 93 FL (ref 82–98)
MONOCYTES # BLD AUTO: 0.7 K/UL (ref 0.3–1)
MONOCYTES NFR BLD: 6.8 % (ref 4–15)
NEUTROPHILS # BLD AUTO: 7.3 K/UL (ref 1.8–7.7)
NEUTROPHILS NFR BLD: 70.6 % (ref 38–73)
NONHDLC SERPL-MCNC: 145 MG/DL
NRBC BLD-RTO: 0 /100 WBC
PLATELET # BLD AUTO: 275 K/UL (ref 150–450)
PMV BLD AUTO: 10.1 FL (ref 9.2–12.9)
POTASSIUM SERPL-SCNC: 4.3 MMOL/L (ref 3.5–5.1)
PROT SERPL-MCNC: 7.7 G/DL (ref 6–8.4)
RBC # BLD AUTO: 4.77 M/UL (ref 4.6–6.2)
SODIUM SERPL-SCNC: 136 MMOL/L (ref 136–145)
TRIGL SERPL-MCNC: 109 MG/DL (ref 30–150)
WBC # BLD AUTO: 10.31 K/UL (ref 3.9–12.7)

## 2024-04-05 PROCEDURE — 80061 LIPID PANEL: CPT | Performed by: FAMILY MEDICINE

## 2024-04-05 PROCEDURE — 85025 COMPLETE CBC W/AUTO DIFF WBC: CPT | Performed by: FAMILY MEDICINE

## 2024-04-05 PROCEDURE — 80053 COMPREHEN METABOLIC PANEL: CPT | Performed by: FAMILY MEDICINE

## 2024-04-05 PROCEDURE — 36415 COLL VENOUS BLD VENIPUNCTURE: CPT | Performed by: FAMILY MEDICINE

## 2024-04-05 PROCEDURE — 84153 ASSAY OF PSA TOTAL: CPT | Performed by: FAMILY MEDICINE

## 2024-04-08 NOTE — PROGRESS NOTES
Please call patient.  LDL increased. ASCVD 10-year risk score is 20.2%. Keep follow-up appointment with PCP. May need to start Cholesterol medication.

## 2024-05-20 ENCOUNTER — PATIENT MESSAGE (OUTPATIENT)
Dept: ADMINISTRATIVE | Facility: HOSPITAL | Age: 64
End: 2024-05-20
Payer: COMMERCIAL

## 2024-09-04 DIAGNOSIS — N52.9 ERECTILE DYSFUNCTION, UNSPECIFIED ERECTILE DYSFUNCTION TYPE: ICD-10-CM

## 2024-09-05 RX ORDER — SILDENAFIL 100 MG/1
TABLET, FILM COATED ORAL
Qty: 24 TABLET | Refills: 5 | Status: SHIPPED | OUTPATIENT
Start: 2024-09-05

## 2024-11-12 ENCOUNTER — PATIENT MESSAGE (OUTPATIENT)
Dept: ADMINISTRATIVE | Facility: HOSPITAL | Age: 64
End: 2024-11-12
Payer: COMMERCIAL

## 2024-11-20 NOTE — TELEPHONE ENCOUNTER
----- Message from Negin Brandon sent at 6/23/2022  2:31 PM CDT -----  Regarding: Med refill  Pt called requesting an rx for motion sickness be sent to [facility] on [address].  States he is going on a cruise and usually gets sea sick.  Please call pt to advise    
n/a

## 2025-01-02 ENCOUNTER — PATIENT MESSAGE (OUTPATIENT)
Dept: ADMINISTRATIVE | Facility: HOSPITAL | Age: 65
End: 2025-01-02
Payer: COMMERCIAL

## 2025-01-15 ENCOUNTER — OFFICE VISIT (OUTPATIENT)
Dept: FAMILY MEDICINE | Facility: CLINIC | Age: 65
End: 2025-01-15
Payer: COMMERCIAL

## 2025-01-15 VITALS
WEIGHT: 255 LBS | SYSTOLIC BLOOD PRESSURE: 150 MMHG | DIASTOLIC BLOOD PRESSURE: 94 MMHG | HEART RATE: 65 BPM | BODY MASS INDEX: 36.51 KG/M2 | HEIGHT: 70 IN | TEMPERATURE: 99 F | OXYGEN SATURATION: 97 %

## 2025-01-15 DIAGNOSIS — E66.01 CLASS 3 SEVERE OBESITY DUE TO EXCESS CALORIES WITHOUT SERIOUS COMORBIDITY WITH BODY MASS INDEX (BMI) OF 40.0 TO 44.9 IN ADULT: ICD-10-CM

## 2025-01-15 DIAGNOSIS — E78.2 MIXED HYPERLIPIDEMIA: ICD-10-CM

## 2025-01-15 DIAGNOSIS — I10 ESSENTIAL HYPERTENSION: ICD-10-CM

## 2025-01-15 DIAGNOSIS — I10 ACCELERATED HYPERTENSION: Primary | ICD-10-CM

## 2025-01-15 DIAGNOSIS — I10 PRIMARY HYPERTENSION: ICD-10-CM

## 2025-01-15 DIAGNOSIS — E55.9 VITAMIN D DEFICIENCY: ICD-10-CM

## 2025-01-15 DIAGNOSIS — E66.813 CLASS 3 SEVERE OBESITY DUE TO EXCESS CALORIES WITHOUT SERIOUS COMORBIDITY WITH BODY MASS INDEX (BMI) OF 40.0 TO 44.9 IN ADULT: ICD-10-CM

## 2025-01-15 PROCEDURE — 99999 PR PBB SHADOW E&M-EST. PATIENT-LVL III: CPT | Mod: PBBFAC,,, | Performed by: FAMILY MEDICINE

## 2025-01-15 PROCEDURE — 1159F MED LIST DOCD IN RCRD: CPT | Mod: CPTII,S$GLB,, | Performed by: FAMILY MEDICINE

## 2025-01-15 PROCEDURE — 3077F SYST BP >= 140 MM HG: CPT | Mod: CPTII,S$GLB,, | Performed by: FAMILY MEDICINE

## 2025-01-15 PROCEDURE — 99213 OFFICE O/P EST LOW 20 MIN: CPT | Mod: S$GLB,,, | Performed by: FAMILY MEDICINE

## 2025-01-15 PROCEDURE — 3008F BODY MASS INDEX DOCD: CPT | Mod: CPTII,S$GLB,, | Performed by: FAMILY MEDICINE

## 2025-01-15 PROCEDURE — 3080F DIAST BP >= 90 MM HG: CPT | Mod: CPTII,S$GLB,, | Performed by: FAMILY MEDICINE

## 2025-01-15 RX ORDER — VALSARTAN AND HYDROCHLOROTHIAZIDE 160; 25 MG/1; MG/1
1 TABLET ORAL DAILY
Qty: 90 TABLET | Refills: 0 | Status: SHIPPED | OUTPATIENT
Start: 2025-01-15

## 2025-01-15 RX ORDER — ERGOCALCIFEROL 1.25 MG/1
50000 CAPSULE ORAL
Qty: 4 CAPSULE | Refills: 11 | Status: SHIPPED | OUTPATIENT
Start: 2025-01-15 | End: 2026-01-15

## 2025-01-15 RX ORDER — AMLODIPINE BESYLATE 10 MG/1
TABLET ORAL
Qty: 90 TABLET | Refills: 0 | Status: SHIPPED | OUTPATIENT
Start: 2025-01-15

## 2025-01-15 NOTE — PROGRESS NOTES
Subjective:       Patient ID: Quentin Palacios is a 64 y.o. male.    Chief Complaint: Medication Refill (Patient is here today for medication refill) and Annual Exam    Patient Active Problem List   Diagnosis    Hypertension    Acute seasonal allergic rhinitis due to pollen    Primary osteoarthritis of left knee    Anxiety    Class 3 severe obesity with body mass index (BMI) of 40.0 to 44.9 in adult    Depression    Palpitations    Encounter for prostate cancer screening    Obesity (BMI 35.0-39.9 without comorbidity)    Accelerated hypertension    Laceration of skin of face    Syncope    Trochanteric bursitis of right hip    Neck pain    Vitamin D deficiency    Mixed hyperlipidemia     Lab Results   Component Value Date    WBC 10.31 04/05/2024    HGB 15.4 04/05/2024    HCT 44.3 04/05/2024     04/05/2024    CHOL 188 04/05/2024    TRIG 109 04/05/2024    HDL 43 04/05/2024    ALT 23 04/05/2024    AST 15 04/05/2024     04/05/2024    K 4.3 04/05/2024     04/05/2024    CREATININE 1.2 04/05/2024    BUN 18 04/05/2024    CO2 30 (H) 04/05/2024    PSA 0.60 04/05/2024    HGBA1C 5.6 08/02/2023         History of Present Illness    CHIEF COMPLAINT:  Mr. Palacios presents for a follow-up visit to address hypertension, arthritis, vitamin D deficiency, and cholesterol management, as well as to obtain prescription refills.    HPI:  Mr. Palacios reports interruption in his blood pressure medication regimen for a few days, which affected his condition. He has headaches or increased frequency of erections when not taking his medication. Mr. Palacios does not regularly monitor his blood pressure at home. He continues to use Viagra periodically. Previous visits indicated slightly elevated A1c levels, suggesting pre-diabetes. Mr. Palacios has a history of shingles and received a vaccine, though the exact date is uncertain and may have preceded 2017.    MEDICATIONS:  Mr. Palacios is on Valsartan and Amlodipine for hypertension. He takes  Vitamin D once a week for vitamin D deficiency. He is also on Viagra, taken periodically for erectile dysfunction.    MEDICAL HISTORY:  Mr. Tinajero has a history of hypertension, arthritis, vitamin D deficiency, hypercholesterolemia, and pre-diabetes. Mr. Tinajero has received a flu vaccine.    TEST RESULTS:  Mr. Tinajero's Vitamin D level was low on January 7th. His previous A1C test showed pre-diabetes, with elevated levels but not diabetic. A lipid panel was previously completed. His PSA test in April showed normal results.      ROS:  Head: +headaches          Allergies and Medications:   Review of patient's allergies indicates:  No Known Allergies  Current Outpatient Medications   Medication Sig Dispense Refill    fluticasone propionate (FLONASE) 50 mcg/actuation nasal spray 1 spray (50 mcg total) by Each Nostril route once daily. 15.8 mL 0    fluticasone propionate (FLONASE) 50 mcg/actuation nasal spray 1 spray (50 mcg total) by Each Nostril route once daily. 16 g 11    sildenafiL (VIAGRA) 100 MG tablet TAKE 1 TABLET AS NEEDED FOR ERECTILE DYSFUNCTION 24 tablet 5    amLODIPine (NORVASC) 10 MG tablet TAKE 1 TABLET BY MOUTH EVERY DAY 90 tablet 0    ergocalciferol (ERGOCALCIFEROL) 50,000 unit Cap Take 1 capsule (50,000 Units total) by mouth every 7 days. 4 capsule 11    valsartan-hydrochlorothiazide (DIOVAN-HCT) 160-25 mg per tablet Take 1 tablet by mouth once daily. 90 tablet 0     No current facility-administered medications for this visit.       Family History:   Family History   Problem Relation Name Age of Onset    Diabetes Mother      Heart failure Mother      Hypertension Mother      Diabetes Father      Hypertension Father      Diabetes Sister      Hypertension Brother      Cancer Maternal Grandfather         Social History:   Social History     Socioeconomic History    Marital status:    Tobacco Use    Smoking status: Never    Smokeless tobacco: Never   Substance and Sexual Activity    Alcohol use: Yes      Comment: occasionally    Drug use: Yes     Types: Marijuana    Sexual activity: Yes     Social Drivers of Health     Stress: No Stress Concern Present (1/8/2021)    [employer] - Occupational Stress Questionnaire     Feeling of Stress : Only a little     Wt Readings from Last 4 Encounters:   01/15/25 115.7 kg (255 lb)   03/22/24 111.6 kg (246 lb)   07/28/23 112 kg (247 lb)   06/07/23 119.3 kg (263 lb)             Objective:     Vitals:    01/15/25 1404   BP: (!) 150/94   Pulse: 65   Temp: 98.7 °F (37.1 °C)        Physical Exam    Vitals: Blood pressure is a little high.  General: No acute distress. Well-developed. Well-nourished.  Eyes: EOMI. Sclerae anicteric.  HENT: Normocephalic. Atraumatic. Nares patent. Moist oral mucosa.  Cardiovascular: Regular rate. Regular rhythm. No murmurs. No rubs. No gallops. Normal S1, S2. PMI in left midclavicular line.  Respiratory: Normal respiratory effort. Clear to auscultation bilaterally. No rales. No rhonchi. No wheezing.  Musculoskeletal: No  obvious deformity.  Extremities: No lower extremity edema.  Neurological: Alert & oriented x3. No slurred speech. Normal gait.  Psychiatric: Normal mood. Normal affect. Good insight. Good judgment.  Skin: Warm. Dry. No rash.  Neck: Thyroid is appreciable.            Assessment:       1. Accelerated hypertension    2. Primary hypertension    3. Mixed hyperlipidemia    4. Vitamin D deficiency    5. Essential hypertension    6. Class 3 severe obesity due to excess calories without serious comorbidity with body mass index (BMI) of 40.0 to 44.9 in adult        Plan:    1. Accelerated hypertension  -     Comprehensive Metabolic Panel; Future; Expected date: 01/15/2025    2. Primary hypertension    3. Mixed hyperlipidemia  -     Lipid Panel; Future; Expected date: 01/15/2025  -     Microalbumin/Creatinine Ratio, Urine; Future; Expected date: 01/15/2025    4. Vitamin D deficiency  -     ergocalciferol  (ERGOCALCIFEROL) 50,000 unit Cap; Take 1 capsule (50,000 Units total) by mouth every 7 days.  Dispense: 4 capsule; Refill: 11  -     Vitamin D; Future; Expected date: 01/15/2025    5. Essential hypertension  -     valsartan-hydrochlorothiazide (DIOVAN-HCT) 160-25 mg per tablet; Take 1 tablet by mouth once daily.  Dispense: 90 tablet; Refill: 0  -     amLODIPine (NORVASC) 10 MG tablet; TAKE 1 TABLET BY MOUTH EVERY DAY  Dispense: 90 tablet; Refill: 0  -     Hypertension Digital Medicine (HDMP) Enrollment Order    6. Class 3 severe obesity due to excess calories without serious comorbidity with body mass index (BMI) of 40.0 to 44.9 in adult  Assessment & Plan:  Patient has been given diabetic dietary recommendations although he is not diabetic at this time will adjust his further on next visit.            Assessment & Plan    IMPRESSION:  - Assessed hypertension management, noting recent medication non-adherence and its impact on blood pressure control  - Evaluated vitamin D deficiency and cholesterol management  - Considered pre-diabetic state based on previous A1c results trending upward  - Performed physical exam, including thyroid, respiratory, and cardiovascular assessment    OBESITY, CLASS 3:  - Mr. Tinajero has gained 9 lbs since March of last year, which was discussed.  - Noted pre-diabetes with increasing A1c levels.  - Recommend a portion-controlled diabetic diet for weight management.  - Ordered A1c test to monitor pre-diabetic condition.    HYPERTENSION:  - Mr. Tinajero ran out of blood pressure medication for a few days, experiencing headaches when not taking the medicine regularly.  - Elevated blood pressure observed during the visit.  - Physical exam revealed regular heart rhythm without murmur, gallop, or rub; PMI in left midclavicular line.  - Emphasized importance of consistent medication adherence to prevent symptoms like headaches.  - Advised on regular home blood pressure monitoring, especially on Monday  "mornings and evenings.  - Refilled three blood pressure medications: Valsartan (combination medication containing 3 components), Amlodipine, and a combination pill.    HYPERLIPIDEMIA:  - Mr. Tinajero here for follow-up on cholesterol condition.  - Ordered lipid panel as part of labs.    PRE-DIABETES:  - Mr. Tinajero here for follow-up on pre-diabetic condition.    ARTHRITIS:  - Mr. Tinajero here for follow-up on arthritis condition.    VITAMIN D DEFICIENCY:  - Mr. Tinajero here for follow-up on vitamin D deficiency.  - Ordered vitamin D level test.  - Refilled vitamin D prescription for weekly dosage.    ERECTILE DYSFUNCTION:  - Mr. Tinajero reports having more erections when not taking blood pressure medication.  - Discussed the relationship between blood pressure medication and erectile function, noting that blood pressure medication should increase blood flow to that area.  - Continued Viagra prescription (as needed).    VACCINATIONS:  - Mr. Tinajero to inquire at the pharmacy about eligibility for RSV, pneumococcal, and shingles vaccines.    LABS:  - Ordered CMP.    FOLLOW UP:  - Scheduled follow-up visit in 6 months (July) for annual follow-up and to review blood pressure control.        Quentin Wyatt" was seen today for medication refill and annual exam.    Diagnoses and all orders for this visit:    Accelerated hypertension  -     Comprehensive Metabolic Panel; Future    Primary hypertension    Mixed hyperlipidemia  -     Lipid Panel; Future  -     Microalbumin/Creatinine Ratio, Urine; Future    Vitamin D deficiency  -     ergocalciferol (ERGOCALCIFEROL) 50,000 unit Cap; Take 1 capsule (50,000 Units total) by mouth every 7 days.  -     Vitamin D; Future    Essential hypertension  -     valsartan-hydrochlorothiazide (DIOVAN-HCT) 160-25 mg per tablet; Take 1 tablet by mouth once daily.  -     amLODIPine (NORVASC) 10 MG tablet; TAKE 1 TABLET BY MOUTH EVERY DAY  -     Hypertension Digital Medicine (HDMP) Enrollment Order    Class " 3 severe obesity due to excess calories without serious comorbidity with body mass index (BMI) of 40.0 to 44.9 in adult         Follow up in about 6 months (around 7/15/2025) for annual.  This note was generated with the assistance of ambient listening technology. Verbal consent was obtained by the patient and accompanying visitor(s) for the recording of patient appointment to facilitate this note. I attest to having reviewed and edited the generated note for accuracy, though some syntax or spelling errors may persist. Please contact the author of this note for any clarification.

## 2025-01-15 NOTE — ASSESSMENT & PLAN NOTE
Patient has been given diabetic dietary recommendations although he is not diabetic at this time will adjust his further on next visit.

## 2025-01-15 NOTE — PATIENT INSTRUCTIONS
We understand that controlling diabetes can feel overwhelming at times. We are here to offer the tools and support to help you manage your diabetes and meet your health goals. Please reference the meal planning guide below.     Diabetic Meal Planning    About this topic  Healthy eating is an important part of keeping your diabetes in control. A balanced diet along with your diabetic drugs will help you control your blood sugar. The right portions of healthy foods may help keep your sugar within your goal range. It may also help to lower or maintain your weight, manage cholesterol, the amount of fat in your blood, and blood pressure.      Image(s)    What will the results be?  Healthy eating may help you lower your blood sugar and keep it in a safe range. Keeping your blood sugar in a safe range may lower your chances for long term problems from your diabetes. You may be less likely to have damage to your kidneys, heart, eyes, or nerves.    What changes to diet are needed?  Healthy eating means:  Eating a range of foods from all food groups.  Eating the right size portions. Read the nutrition facts on each food you eat.  Eating meals and snacks at the same time each day. Do not skip a meal or snack. Skipping meals may cause your blood sugar to go too low, especially if you are on drugs for your diabetes. Skipping meals can also cause you to eat too much at the next meal.  Talk to your diabetes educator about making a personal meal plan for you. They can also help you eat the foods you like by modifying them to be healthier.    Who should use this diet?  This diet is helpful to people with high blood sugar or diabetes.    What foods are good to eat?  It is important to make a healthy meal. Eat a variety of different foods in the right portion.  Fill half of your plate with non-starchy vegetables. Non-starchy vegetables include: Broccoli, green beans, carrots, greens (rayne, kale, mustard, turnip), onions, tomatoes,  asparagus, beets, cauliflower, celery, and cucumber. Non-starchy vegetables are high in fiber and low in carbohydrates. These will help keep you full for longer without raising your blood sugar.  Fill 1/4 of your plate with carbohydrates. Try to choose whole grain options. Whole-grain products have more fiber which will make you feel full. Carbohydrates include: Bread, rice, pasta, and starchy vegetables. Starchy vegetables include beans, potatoes, acorn squash, butternut squash, corn, and peas.  Fill 1/4 of your plate with protein. Choose low-fat cuts of meat like boneless, skinless chicken breast; pork loin; 90% lean beef; lean and skinless turkey meat; and fresh fish (not fried) such as tuna, salmon, or mackerel.  Add a low-fat or non-fat dairy product like 8 ounces (240 mL) of 1% or skim milk or 6 ounces (180 mL) of low-fat yogurt. Eat the recommended serving. Milk and yogurt have carbohydrates which raise your blood sugar.  Add a small piece of fruit or 1/2 cup (120 grams) of frozen or canned fruit. Choose canned fruits in juice or water. Fruits include apples, bananas, peaches, pears, pineapple, plums, and oranges. Eat the recommended serving. Fruit has carbohydrates which can raise your blood sugar.  Include healthy fats in your meal like olive oil, canola oil, avocado, and nuts.  Other good foods to include in your diet are:  Foods high in fiber. Adding fiber to your meals may help control your blood sugar and cholesterol levels. It may also help with weight loss and prevent belly problems. If you are younger than 50, it is recommended to get 25 to 38 grams per day. If you are older than 50, it is recommended to get 21 to 30 grams per day. Good sources of fiber include:  Nuts and seeds  Beans, peas, and other legumes  Whole-grain products  Fruits  Vegetables  Smart snacks in the right portion. Do not go too long in between meals. Ask your dietitian when you should have a snack in between your meals. Snack on  things like:  Nuts  Vegetables and low-fat dressing  Light popcorn  Low-fat cheese and crackers  1/2 sandwich    What foods should be limited or avoided?  You may need to limit the amount of some foods you eat and how often you eat them. Foods that should be limited include:  High fat or processed foods like:  Perkins  Sausage  Hot dogs  Whole-fat dairy products  Potato chips  Foods high in sodium like:  Canned soups  Soy sauce and some salad dressings  Cured meats  Salted snack foods like pretzels  Olives  Fats and oils like:  Margarine  Salad dressing  Gravy  Lard or shortening  Foods high in sugar like:  Candy  Cookies  Cake  Ice cream  Table sugar  Soda  Juice drinks  Starches that are not whole grain like:  White rice  French fries  White pasta  White bread  Sugary cereals  Baked goods, pastries, croissants  Beer, wine, and mixed drinks (alcohol). Drink alcohol in moderation (1 drink per day or less for adult women and 2 drinks per day or less for adult men). Drinking alcohol can cause fluctuations in your blood sugar and interfere with how your diabetic drugs work. Talk to your doctor about how you can safely include alcohol into your diet.    What can be done to prevent this health problem?  Some people have a higher chance of developing diabetes than others. This is a life-long problem. You can still lead a normal life. Diabetes can be managed through diet, exercise, and drugs. It is important to have support from your family and friends to help you with your goals.    When do I need to call the doctor?  Blood sugar level is above 240 mg/dL for more than a day  Blood sugar level drops to less than 40 and does not respond to 15 grams of simple carbohydrate, like 4 glucose tablets or 1/2 cup (120 mL) of fruit juice  Trouble breathing  Very sleepy and trouble concentrating  Feeling very thirsty  Needing to urinate (pee) more than normal  Throw up more than once or have many loose stools  You are so sick you  cannot eat or drink  Fever over 101°F (38°C)  Questions about your diet plan  You are not feeling better in 2 to 3 days or you are feeling worse    Helpful tips  Plan ahead. Plan your meals and grocery list before going to the store. This will help you to choose foods that are good for you.  Pack a lunch. Take healthy meals and snacks with you to work.  Keep a food journal to help keep you on track. Take note of foods that keep your blood sugar in goal range. Also note foods and meals that raise or lower your blood sugar. There are apps for your phone that can help with this.  Visit a restaurant's website before eating out. You can see the menu options and nutritional facts. This way, you can see which items best fit into your diet plan. Call ahead if you have questions.    Disclaimer.This generalized information is a limited summary of diagnosis, treatment, and/or medication information. It is not meant to be comprehensive and should be used as a tool to help the user understand and/or assess potential diagnostic and treatment options. It does NOT include all information about conditions, treatments, medications, side effects, or risks that may apply to a specific patient. It is not intended to be medical advice or a substitute for the medical advice, diagnosis, or treatment of a health care provider based on the health care provider's examination and assessment of a patient's specific and unique circumstances. Patients must speak with a health care provider for complete information about their health, medical questions, and treatment options, including any risks or benefits regarding use of medications. This information does not endorse any treatments or medications as safe, effective, or approved for treating a specific patient. [employer] and its affiliates disclaim any warranty or liability relating to this information or the use thereof. The use of this information is governed by the Terms of Use,  available at Terms of Use. ©2022 [employer]Date, Inc. and its affiliates and/or licensors. All rights reserved. Avoid anything with sugar in the 1st 3 ingredients including honey and syrup.  Avoid dried fruits like raise nodes and apricots.  Other fruits and vegetables are okay but the sugar content is higher in bananas and grapes.  Avoid large portions on your starchy foods:  Bread rice potatoes and pasta.

## 2025-02-21 ENCOUNTER — PATIENT MESSAGE (OUTPATIENT)
Dept: ADMINISTRATIVE | Facility: HOSPITAL | Age: 65
End: 2025-02-21
Payer: COMMERCIAL

## 2025-03-10 ENCOUNTER — PATIENT MESSAGE (OUTPATIENT)
Dept: ADMINISTRATIVE | Facility: HOSPITAL | Age: 65
End: 2025-03-10
Payer: COMMERCIAL

## 2025-03-12 DIAGNOSIS — J30.1 ACUTE SEASONAL ALLERGIC RHINITIS DUE TO POLLEN: ICD-10-CM

## 2025-03-12 DIAGNOSIS — N52.9 ERECTILE DYSFUNCTION, UNSPECIFIED ERECTILE DYSFUNCTION TYPE: ICD-10-CM

## 2025-03-12 RX ORDER — FLUTICASONE PROPIONATE 50 MCG
1 SPRAY, SUSPENSION (ML) NASAL DAILY
Qty: 16 G | Refills: 11 | Status: SHIPPED | OUTPATIENT
Start: 2025-03-12 | End: 2026-03-12

## 2025-03-12 NOTE — TELEPHONE ENCOUNTER
Care Due:                  Date            Visit Type   Department     Provider  --------------------------------------------------------------------------------                                EP - [facility] [address]  Last Visit: 01-      [facility] ([facility])   FAMILY MEDICINERory Duffour EP - [facility] [address]  Next Visit: 07-      [facility] ([facility]                                                            Last  Test          Frequency    Reason                     Performed    Due Date  --------------------------------------------------------------------------------    CMP.........  12 months..  ergocalciferol,            04- 03-                             valsartan-hydrochlorothia                             zide.....................    Vitamin D...  12 months..  ergocalciferol...........  Not Found    Overdue    [employer] Embedded Care Due Messages. Reference number: [id number].   3/12/2025 9:53:00 AM CDT

## 2025-03-12 NOTE — TELEPHONE ENCOUNTER
----- Message from Coleen sent at 3/12/2025  9:42 AM CDT -----  Pt needs refill on nose spray [facility] [address] [phone]

## 2025-05-26 ENCOUNTER — OFFICE VISIT (OUTPATIENT)
Dept: FAMILY MEDICINE | Facility: CLINIC | Age: 65
End: 2025-05-26
Payer: COMMERCIAL

## 2025-05-26 VITALS
BODY MASS INDEX: 37.09 KG/M2 | HEART RATE: 78 BPM | OXYGEN SATURATION: 94 % | HEIGHT: 70 IN | DIASTOLIC BLOOD PRESSURE: 89 MMHG | TEMPERATURE: 99 F | WEIGHT: 259.06 LBS | SYSTOLIC BLOOD PRESSURE: 138 MMHG

## 2025-05-26 DIAGNOSIS — H65.92 LEFT OTITIS MEDIA WITH EFFUSION: ICD-10-CM

## 2025-05-26 DIAGNOSIS — H92.02 OTALGIA, LEFT: Primary | ICD-10-CM

## 2025-05-26 PROCEDURE — 3075F SYST BP GE 130 - 139MM HG: CPT | Mod: CPTII,S$GLB,, | Performed by: NURSE PRACTITIONER

## 2025-05-26 PROCEDURE — 99213 OFFICE O/P EST LOW 20 MIN: CPT | Mod: S$GLB,,, | Performed by: NURSE PRACTITIONER

## 2025-05-26 PROCEDURE — 3079F DIAST BP 80-89 MM HG: CPT | Mod: CPTII,S$GLB,, | Performed by: NURSE PRACTITIONER

## 2025-05-26 PROCEDURE — 3008F BODY MASS INDEX DOCD: CPT | Mod: CPTII,S$GLB,, | Performed by: NURSE PRACTITIONER

## 2025-05-26 PROCEDURE — 99999 PR PBB SHADOW E&M-EST. PATIENT-LVL III: CPT | Mod: PBBFAC,,, | Performed by: NURSE PRACTITIONER

## 2025-05-26 PROCEDURE — 1159F MED LIST DOCD IN RCRD: CPT | Mod: CPTII,S$GLB,, | Performed by: NURSE PRACTITIONER

## 2025-05-26 RX ORDER — PREDNISONE 10 MG/1
TABLET ORAL
Qty: 10 TABLET | Refills: 0 | Status: SHIPPED | OUTPATIENT
Start: 2025-05-26

## 2025-05-26 RX ORDER — AZITHROMYCIN 250 MG/1
TABLET, FILM COATED ORAL
Qty: 6 TABLET | Refills: 0 | Status: SHIPPED | OUTPATIENT
Start: 2025-05-26

## 2025-05-26 NOTE — PROGRESS NOTES
This dictation has been generated using Modal Fluency Dictation some phonetic errors may occur. Please contact author for clarification if needed.     1. Otalgia, left    2. Left otitis media with effusion    Other orders  -     azithromycin (Z-MARISELA) 250 MG tablet; Take 2 tablets by mouth on day 1; Take 1 tablet by mouth on days 2-5  Dispense: 6 tablet; Refill: 0  -     predniSONE (DELTASONE) 10 MG tablet; 4 by mouth once today, then 3 by mouth tomorrow, then 2 by mouth on day 3, then one by mouth on the last day.  Dispense: 10 tablet; Refill: 0       Otalgia on the left. OME rx as above. Restart flonase.     I will review all results and address accordingly.   No follow-ups on file.    ________________________________________________________________  ________________________________________________________________      Chief Complaint   Patient presents with    Otalgia     History of present illness    This 64 y.o. presents today for complaint of left ear pain. No drainage. Notes some jaw pain and recent dental procedure and rx for amoxil. Ear became painful during amoxil course. No fever or chills. No sore throat. No cp or sob.       Past Medical History:   Diagnosis Date    Anxiety     Class 2 obesity without serious comorbidity with body mass index (BMI) of 39.0 to 39.9 in adult 5/22/2018    Depression     Hypertension        Past Surgical History:   Procedure Laterality Date    REFRACTIVE SURGERY      WISDOM TOOTH EXTRACTION         Family History   Problem Relation Name Age of Onset    Diabetes Mother      Heart failure Mother      Hypertension Mother      Diabetes Father      Hypertension Father      Diabetes Sister      Hypertension Brother      Cancer Maternal Grandfather         Social History     Socioeconomic History    Marital status:    Tobacco Use    Smoking status: Never    Smokeless tobacco: Never   Substance and Sexual Activity    Alcohol use: Yes     Comment: occasionally    Drug use: Yes  "    Types: Marijuana    Sexual activity: Yes     Social Drivers of Health     Stress: No Stress Concern Present (1/8/2021)    [employer] [employer] - Occupational Stress Questionnaire     Feeling of Stress : Only a little       Current Medications[1]    Review of patient's allergies indicates:  No Known Allergies    Physical examination  Vitals Reviewed  /89   Pulse 78   Temp 98.5 °F (36.9 °C) (Oral)   Ht 5' 10" (1.778 m)   Wt 117.5 kg (259 lb 0.7 oz)   SpO2 (!) 94%   BMI 37.17 kg/m²  Body mass index is 37.17 kg/m².     BP Readings from Last 3 Encounters:   05/26/25 138/89   01/15/25 (!) 150/94   03/22/24 (!) 150/90       Wt Readings from Last 3 Encounters:   05/26/25 117.5 kg (259 lb 0.7 oz)   01/15/25 115.7 kg (255 lb)   03/22/24 111.6 kg (246 lb)     Gen. Well-dressed well-nourished   Skin warm dry and intact.  No rashes noted.  HEENT.  TM intact bilateral with abnormal light reflex on the left.  No mastoid tenderness during percussion.  Nares patent bilateral.  Pharynx is unremarkable.  No maxillary or frontal sinus tenderness when percussed.    Neck is supple without adenopathy  Chest.  Respirations are even unlabored.  Lungs are clear to auscultation.  Cardiac regular rate and rhythm.  No chest wall adenopathy noted.  Neuro. Awake alert oriented x4.  Normal judgment and cognition noted.  Extremities no clubbing cyanosis or edema noted.     Call or return to clinic prn if these symptoms worsen or fail to improve as anticipated.           [1]   Current Outpatient Medications   Medication Sig Dispense Refill    amLODIPine (NORVASC) 10 MG tablet TAKE 1 TABLET BY MOUTH EVERY DAY 90 tablet 0    ergocalciferol (ERGOCALCIFEROL) 50,000 unit Cap Take 1 capsule (50,000 Units total) by mouth every 7 days. 4 capsule 11    fluticasone propionate (FLONASE) 50 mcg/actuation nasal spray 1 spray (50 mcg total) by Each Nostril route once daily. 16 g 11    sildenafiL (VIAGRA) 100 MG tablet TAKE 1 " TABLET AS NEEDED FOR ERECTILE DYSFUNCTION 24 tablet 5    valsartan-hydrochlorothiazide (DIOVAN-HCT) 160-25 mg per tablet Take 1 tablet by mouth once daily. 90 tablet 0    azithromycin (Z-MARISELA) 250 MG tablet Take 2 tablets by mouth on day 1; Take 1 tablet by mouth on days 2-5 6 tablet 0    predniSONE (DELTASONE) 10 MG tablet 4 by mouth once today, then 3 by mouth tomorrow, then 2 by mouth on day 3, then one by mouth on the last day. 10 tablet 0     No current facility-administered medications for this visit.

## 2025-06-04 DIAGNOSIS — I10 ESSENTIAL HYPERTENSION: ICD-10-CM

## 2025-06-04 RX ORDER — AMLODIPINE BESYLATE 10 MG/1
TABLET ORAL
Qty: 90 TABLET | Refills: 0 | Status: SHIPPED | OUTPATIENT
Start: 2025-06-04

## 2025-06-09 DIAGNOSIS — I10 ESSENTIAL HYPERTENSION: ICD-10-CM

## 2025-06-09 RX ORDER — VALSARTAN AND HYDROCHLOROTHIAZIDE 160; 25 MG/1; MG/1
1 TABLET ORAL DAILY
Qty: 90 TABLET | Refills: 0 | Status: SHIPPED | OUTPATIENT
Start: 2025-06-09

## 2025-06-09 NOTE — TELEPHONE ENCOUNTER
No care due was identified.  [facility] Embedded Care Due Messages. Reference number: [id number].   6/09/2025 2:15:04 PM CDT

## 2025-06-27 ENCOUNTER — OFFICE VISIT (OUTPATIENT)
Dept: FAMILY MEDICINE | Facility: CLINIC | Age: 65
End: 2025-06-27
Payer: COMMERCIAL

## 2025-06-27 VITALS
HEIGHT: 70 IN | SYSTOLIC BLOOD PRESSURE: 128 MMHG | TEMPERATURE: 98 F | DIASTOLIC BLOOD PRESSURE: 76 MMHG | WEIGHT: 259.25 LBS | HEART RATE: 78 BPM | BODY MASS INDEX: 37.11 KG/M2 | OXYGEN SATURATION: 96 %

## 2025-06-27 DIAGNOSIS — G50.8 TRIGEMINAL NEURITIS: ICD-10-CM

## 2025-06-27 DIAGNOSIS — H92.02 LEFT EAR PAIN: Primary | ICD-10-CM

## 2025-06-27 PROCEDURE — 99999 PR PBB SHADOW E&M-EST. PATIENT-LVL III: CPT | Mod: PBBFAC,,, | Performed by: NURSE PRACTITIONER

## 2025-06-27 RX ORDER — CARBAMAZEPINE 100 MG/1
100 TABLET, EXTENDED RELEASE ORAL 2 TIMES DAILY
Qty: 60 TABLET | Refills: 1 | Status: SHIPPED | OUTPATIENT
Start: 2025-06-27
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
## 2025-07-03 ENCOUNTER — OFFICE VISIT (OUTPATIENT)
Dept: FAMILY MEDICINE | Facility: CLINIC | Age: 65
End: 2025-07-03
Payer: COMMERCIAL

## 2025-07-03 VITALS
TEMPERATURE: 98 F | DIASTOLIC BLOOD PRESSURE: 93 MMHG | WEIGHT: 262.81 LBS | HEIGHT: 70 IN | HEART RATE: 87 BPM | BODY MASS INDEX: 37.62 KG/M2 | SYSTOLIC BLOOD PRESSURE: 153 MMHG | OXYGEN SATURATION: 96 %

## 2025-07-03 DIAGNOSIS — I10 ACCELERATED HYPERTENSION: ICD-10-CM

## 2025-07-03 DIAGNOSIS — E78.2 MIXED HYPERLIPIDEMIA: Primary | ICD-10-CM

## 2025-07-03 DIAGNOSIS — F34.1 PERSISTENT DEPRESSIVE DISORDER: ICD-10-CM

## 2025-07-03 DIAGNOSIS — I10 PRIMARY HYPERTENSION: ICD-10-CM

## 2025-07-03 PROCEDURE — 99999 PR PBB SHADOW E&M-EST. PATIENT-LVL III: CPT | Mod: PBBFAC,,, | Performed by: FAMILY MEDICINE

## 2025-07-03 RX ORDER — VALSARTAN AND HYDROCHLOROTHIAZIDE 320; 25 MG/1; MG/1
1 TABLET, FILM COATED ORAL DAILY
Qty: 90 TABLET | Refills: 3 | Status: SHIPPED | OUTPATIENT
Start: 2025-07-03 | End: 2026-07-03

## 2025-07-03 RX ORDER — HYDROCODONE BITARTRATE AND ACETAMINOPHEN 5; 325 MG/1; MG/1
1 TABLET ORAL EVERY 6 HOURS PRN
COMMUNITY
Start: 2025-07-02

## 2025-07-03 RX ORDER — AMOXICILLIN AND CLAVULANATE POTASSIUM 500; 125 MG/1; MG/1
TABLET, FILM COATED ORAL
COMMUNITY
Start: 2025-07-02

## 2025-07-03 NOTE — PROGRESS NOTES
Subjective:       Patient ID: Quentin Palacios is a 64 y.o. male.    Chief Complaint: Annual Exam and Headache      Patient Active Problem List:     Hypertension     Acute seasonal allergic rhinitis due to pollen     Primary osteoarthritis of left knee     Anxiety     Class 3 severe obesity with body mass index (BMI) of 40.0 to 44.9 in adult     Depression     Palpitations     Encounter for prostate cancer screening     Obesity (BMI 35.0-39.9 without comorbidity)     Accelerated hypertension     Laceration of skin of face     Syncope     Trochanteric bursitis of right hip     Neck pain     Vitamin D deficiency     Mixed hyperlipidemia            Allergies and Medications:   Review of patient's allergies indicates:  No Known Allergies  Current Medications[1]    Family History:   Family History   Problem Relation Name Age of Onset    Diabetes Mother      Heart failure Mother      Hypertension Mother      Diabetes Father      Hypertension Father      Diabetes Sister      Hypertension Brother      Cancer Maternal Grandfather         Social History:   Social History[2]    Review of Systems   Constitutional:  Negative for activity change, appetite change, chills, diaphoresis, fatigue, fever and unexpected weight change.   HENT:  Positive for dental problem (SECTIONS.  HAS HAD SOME DENTAL PAIN AFTER THIS.) and ear pain (Chronic but actually due to his dental issues). Negative for congestion, drooling, ear discharge, facial swelling, hearing loss, mouth sores, nosebleeds, postnasal drip, rhinorrhea, sinus pressure, sinus pain, sneezing, sore throat, tinnitus, trouble swallowing and voice change.    Eyes:  Negative for photophobia, pain, discharge, redness, itching and visual disturbance.   Respiratory:  Positive for chest tightness (when coughing has resolved.). Negative for apnea, cough, choking, shortness of breath, wheezing and stridor.    Cardiovascular:  Negative for chest pain, palpitations and leg swelling.    Gastrointestinal:  Negative for abdominal distention, abdominal pain, anal bleeding, blood in stool, constipation, diarrhea, nausea, rectal pain and vomiting.   Endocrine: Negative for cold intolerance, heat intolerance, polydipsia, polyphagia and polyuria.   Genitourinary:  Negative for decreased urine volume, difficulty urinating, dysuria, enuresis, flank pain, frequency, genital sores, hematuria, penile discharge, penile pain, penile swelling, scrotal swelling, testicular pain and urgency.        Nocturia once at most.   Musculoskeletal:  Positive for arthralgias (Multiple joints symmetrical both sides.). Negative for back pain, gait problem, joint swelling, myalgias, neck pain and neck stiffness.   Skin:  Negative for color change, pallor, rash and wound.   Allergic/Immunologic: Negative for environmental allergies, food allergies and immunocompromised state.   Neurological:  Positive for headaches. Negative for dizziness, tremors, seizures, syncope, facial asymmetry, speech difficulty, weakness, light-headedness and numbness.   Hematological:  Negative for adenopathy. Does not bruise/bleed easily.   Psychiatric/Behavioral:  Negative for agitation, behavioral problems, confusion, decreased concentration, dysphoric mood, hallucinations, self-injury, sleep disturbance and suicidal ideas. The patient is not nervous/anxious and is not hyperactive.        Objective:     Vitals:    07/03/25 1441   BP: (!) 153/93   Pulse: 87   Temp: 98.1 °F (36.7 °C)        Physical Exam  Vitals and nursing note reviewed.   Constitutional:       General: He is not in acute distress.     Appearance: Normal appearance. He is well-developed and normal weight. He is not ill-appearing, toxic-appearing or diaphoretic.   HENT:      Head: Normocephalic and atraumatic.      Right Ear: Tympanic membrane, ear canal and external ear normal. There is no impacted cerumen.      Left Ear: Tympanic membrane, ear canal and external ear normal. There  is no impacted cerumen.      Nose: Nose normal. No congestion or rhinorrhea.      Mouth/Throat:      Mouth: Mucous membranes are moist.      Pharynx: Oropharynx is clear. No oropharyngeal exudate or posterior oropharyngeal erythema.   Eyes:      General: No scleral icterus.        Right eye: No discharge.         Left eye: No discharge.      Extraocular Movements: Extraocular movements intact.      Conjunctiva/sclera: Conjunctivae normal.      Pupils: Pupils are equal, round, and reactive to light.   Neck:      Thyroid: No thyromegaly.      Vascular: No carotid bruit or JVD.      Trachea: No tracheal deviation.   Cardiovascular:      Rate and Rhythm: Normal rate.      Heart sounds: Normal heart sounds. No murmur heard.     No friction rub. No gallop.   Pulmonary:      Effort: Pulmonary effort is normal. No respiratory distress.      Breath sounds: Normal breath sounds. No stridor. No wheezing, rhonchi or rales.   Chest:      Chest wall: No tenderness.   Abdominal:      General: Abdomen is flat. Bowel sounds are normal. There is no distension.      Palpations: Abdomen is soft. There is no mass.      Tenderness: There is no abdominal tenderness. There is no right CVA tenderness, left CVA tenderness, guarding or rebound.      Hernia: No hernia is present.   Genitourinary:     Penis: Normal and circumcised. No phimosis, paraphimosis, hypospadias, erythema, tenderness or discharge.       Testes: Normal. Cremasteric reflex is present.         Right: Mass, tenderness or swelling not present. Right testis is descended. Cremasteric reflex is present.          Left: Mass, tenderness or swelling not present. Left testis is descended. Cremasteric reflex is present.       Prostate: Normal.      Rectum: Normal. Guaiac result negative.   Musculoskeletal:         General: No swelling, tenderness, deformity or signs of injury.      Cervical back: Normal range of motion and neck supple. No rigidity or tenderness. No muscular  "tenderness.      Right lower leg: No edema.      Left lower leg: No edema.   Lymphadenopathy:      Cervical: No cervical adenopathy.   Skin:     General: Skin is warm and dry.      Capillary Refill: Capillary refill takes less than 2 seconds.      Coloration: Skin is not jaundiced or pale.      Findings: No bruising, erythema, lesion or rash.   Neurological:      General: No focal deficit present.      Mental Status: He is alert and oriented to person, place, and time.      Cranial Nerves: No cranial nerve deficit.      Sensory: No sensory deficit.      Motor: No weakness or abnormal muscle tone.      Coordination: Coordination normal.      Gait: Gait normal.      Deep Tendon Reflexes: Reflexes are normal and symmetric. Reflexes normal.   Psychiatric:         Mood and Affect: Mood normal.         Behavior: Behavior normal.         Thought Content: Thought content normal.         Judgment: Judgment normal.         Assessment:       1. Mixed hyperlipidemia    2. Accelerated hypertension    3. Persistent depressive disorder    4. Primary hypertension        Plan:       Quentin Wyatt" was seen today for annual exam and headache.    Diagnoses and all orders for this visit:    Mixed hyperlipidemia    Accelerated hypertension  -     valsartan-hydrochlorothiazide (DIOVAN-HCT) 320-25 mg per tablet; Take 1 tablet by mouth once daily.    Persistent depressive disorder    Primary hypertension         Follow up in about 2 weeks (around 7/17/2025) for bp recheck nursing visit.Subjective:       Patient ID: Quentin Palacios is a 64 y.o. male.    Chief Complaint: Annual Exam and Headache      History of Present Illness               Allergies and Medications:   Review of patient's allergies indicates:  No Known Allergies  Current Medications[3]    Family History:   Family History   Problem Relation Name Age of Onset    Diabetes Mother      Heart failure Mother      Hypertension Mother      Diabetes Father      Hypertension Father   " "   Diabetes Sister      Hypertension Brother      Cancer Maternal Grandfather         Social History:   Social History[4]        Objective:     Vitals:    07/03/25 1441   BP: (!) 153/93   Pulse: 87   Temp: 98.1 °F (36.7 °C)        Physical Exam                 Assessment:       1. Mixed hyperlipidemia    2. Accelerated hypertension    3. Persistent depressive disorder    4. Primary hypertension        Plan:       Assessment & Plan             Quentin Wyatt" was seen today for annual exam and headache.    Diagnoses and all orders for this visit:    Mixed hyperlipidemia    Accelerated hypertension  -     valsartan-hydrochlorothiazide (DIOVAN-HCT) 320-25 mg per tablet; Take 1 tablet by mouth once daily.    Persistent depressive disorder    Primary hypertension         Follow up in about 2 weeks (around 7/17/2025) for bp recheck nursing visit.  This note was generated with the assistance of ambient listening technology. Verbal consent was obtained by the patient and accompanying visitor(s) for the recording of patient appointment to facilitate this note. I attest to having reviewed and edited the generated note for accuracy, though some syntax or spelling errors may persist. Please contact the author of this note for any clarification.            [1]   Current Outpatient Medications   Medication Sig Dispense Refill    amLODIPine (NORVASC) 10 MG tablet TAKE 1 TABLET BY MOUTH EVERY DAY 90 tablet 0    amoxicillin-clavulanate 500-125mg (AUGMENTIN) 500-125 mg Tab Take by mouth.      carBAMazepine (TEGRETOL XR) 100 MG 12 hr tablet Take 1 tablet (100 mg total) by mouth 2 (two) times daily. 60 tablet 1    ergocalciferol (ERGOCALCIFEROL) 50,000 unit Cap Take 1 capsule (50,000 Units total) by mouth every 7 days. 4 capsule 11    HYDROcodone-acetaminophen (NORCO) 5-325 mg per tablet Take 1 tablet by mouth every 6 (six) hours as needed.      sildenafiL (VIAGRA) 100 MG tablet TAKE 1 TABLET AS NEEDED FOR ERECTILE DYSFUNCTION 24 tablet " 5    fluticasone propionate (FLONASE) 50 mcg/actuation nasal spray 1 spray (50 mcg total) by Each Nostril route once daily. (Patient not taking: Reported on 7/3/2025) 16 g 11    valsartan-hydrochlorothiazide (DIOVAN-HCT) 320-25 mg per tablet Take 1 tablet by mouth once daily. 90 tablet 3     No current facility-administered medications for this visit.   [2]   Social History  Socioeconomic History    Marital status:    Tobacco Use    Smoking status: Never    Smokeless tobacco: Never   Substance and Sexual Activity    Alcohol use: Yes     Comment: occasionally    Drug use: Yes     Types: Marijuana    Sexual activity: Yes     Social Drivers of Health     Stress: No Stress Concern Present (1/8/2021)    [employer] [place] [employer] - Occupational Stress Questionnaire     Feeling of Stress : Only a little   [3]   Current Outpatient Medications   Medication Sig Dispense Refill    amLODIPine (NORVASC) 10 MG tablet TAKE 1 TABLET BY MOUTH EVERY DAY 90 tablet 0    amoxicillin-clavulanate 500-125mg (AUGMENTIN) 500-125 mg Tab Take by mouth.      carBAMazepine (TEGRETOL XR) 100 MG 12 hr tablet Take 1 tablet (100 mg total) by mouth 2 (two) times daily. 60 tablet 1    ergocalciferol (ERGOCALCIFEROL) 50,000 unit Cap Take 1 capsule (50,000 Units total) by mouth every 7 days. 4 capsule 11    HYDROcodone-acetaminophen (NORCO) 5-325 mg per tablet Take 1 tablet by mouth every 6 (six) hours as needed.      sildenafiL (VIAGRA) 100 MG tablet TAKE 1 TABLET AS NEEDED FOR ERECTILE DYSFUNCTION 24 tablet 5    fluticasone propionate (FLONASE) 50 mcg/actuation nasal spray 1 spray (50 mcg total) by Each Nostril route once daily. (Patient not taking: Reported on 7/3/2025) 16 g 11    valsartan-hydrochlorothiazide (DIOVAN-HCT) 320-25 mg per tablet Take 1 tablet by mouth once daily. 90 tablet 3     No current facility-administered medications for this visit.   [4]   Social History  Socioeconomic History    Marital status:     Tobacco Use    Smoking status: Never    Smokeless tobacco: Never   Substance and Sexual Activity    Alcohol use: Yes     Comment: occasionally    Drug use: Yes     Types: Marijuana    Sexual activity: Yes     Social Drivers of Health     Stress: No Stress Concern Present (1/8/2021)    [employer] [place] [employer] - Occupational Stress Questionnaire     Feeling of Stress : Only a little

## 2025-07-23 ENCOUNTER — HOSPITAL ENCOUNTER (OUTPATIENT)
Dept: RADIOLOGY | Facility: HOSPITAL | Age: 65
Discharge: HOME | End: 2025-07-23
Attending: STUDENT IN AN ORGANIZED HEALTH CARE EDUCATION/TRAINING PROGRAM
Payer: COMMERCIAL

## 2025-07-23 DIAGNOSIS — R22.0 LOCALIZED SWELLING, MASS, AND LUMP OF HEAD: ICD-10-CM

## 2025-07-23 DIAGNOSIS — R22.0 LOCALIZED SWELLING, MASS AND LUMP, HEAD: Primary | ICD-10-CM

## 2025-07-23 DIAGNOSIS — R22.0 LOCALIZED SWELLING, MASS, AND LUMP OF HEAD: Primary | ICD-10-CM

## 2025-07-23 PROCEDURE — 25500020 PHARM REV CODE 255: Performed by: STUDENT IN AN ORGANIZED HEALTH CARE EDUCATION/TRAINING PROGRAM

## 2025-07-23 PROCEDURE — 70491 CT SOFT TISSUE NECK W/DYE: CPT | Mod: TC

## 2025-07-23 PROCEDURE — 70491 CT SOFT TISSUE NECK W/DYE: CPT | Mod: 26,,, | Performed by: RADIOLOGY

## 2025-07-23 RX ADMIN — IOHEXOL 100 ML: 350 INJECTION, SOLUTION INTRAVENOUS at 05:07

## 2025-07-25 ENCOUNTER — TELEPHONE (OUTPATIENT)
Dept: HEMATOLOGY/ONCOLOGY | Facility: CLINIC | Age: 65
End: 2025-07-25
Payer: COMMERCIAL

## 2025-07-25 NOTE — NURSING
Returned call to patient. Patient states Dr. Rodriguez is sending over referral to Dr. Gonzalez and progress notes for neck mass seen on CT imaging that requires biopsy and treatment.  Spoke with patient, and he accepted next available appointment of Monday, July 28th, at 0800.  Date, time, and location confirmed.  Confirmed with patient that he has R insurance.    Oncology Navigation   Intake  Cancer Type: Head and Neck  Type of Referral: External  Date of Referral: 07/25/25  Initial Nurse Navigator Contact: 07/25/25  Referral to Initial Contact Timeline (days): 0  First Appointment Available: 07/28/25  Appointment Date: 07/28/25  First Available Date vs. Scheduled Date (days): 0     Treatment  Current Status: Staging work-up    Surgical Oncologist: Dr. Gonzalez (head and neck oncology surgeon)  Consult Date: 07/28/25          Procedures: CT  CT Schedule Date: 07/23/25                 Acuity      Follow Up  No follow-ups on file.

## 2025-07-25 NOTE — TELEPHONE ENCOUNTER
Copied from CRM #[id number]. Topic: General Inquiry - Patient Advice  >> Jul 25, 2025  3:31 PM Kulwant wrote:  Type: Needs Medical Advice  Who Called:  pt   Symptoms (please be specific):    How long has patient had these symptoms:    Pharmacy name and phone #:    Best Call Back Number: [phone]  Additional Information: pt is requesting a call back to schedule a Np appt with Dr Gonzalez with referral from Dr Rodriguez

## 2025-07-28 ENCOUNTER — OFFICE VISIT (OUTPATIENT)
Dept: HEMATOLOGY/ONCOLOGY | Facility: CLINIC | Age: 65
End: 2025-07-28
Payer: COMMERCIAL

## 2025-07-28 ENCOUNTER — TELEPHONE (OUTPATIENT)
Dept: FAMILY MEDICINE | Facility: CLINIC | Age: 65
End: 2025-07-28
Payer: COMMERCIAL

## 2025-07-28 VITALS
TEMPERATURE: 97 F | OXYGEN SATURATION: 97 % | DIASTOLIC BLOOD PRESSURE: 110 MMHG | HEIGHT: 70 IN | SYSTOLIC BLOOD PRESSURE: 181 MMHG | BODY MASS INDEX: 37.71 KG/M2 | WEIGHT: 263.44 LBS | HEART RATE: 76 BPM | RESPIRATION RATE: 16 BRPM

## 2025-07-28 DIAGNOSIS — K11.8 PAROTID MASS: Primary | ICD-10-CM

## 2025-07-28 PROCEDURE — 10005 FNA BX W/US GDN 1ST LES: CPT | Mod: S$GLB,,, | Performed by: STUDENT IN AN ORGANIZED HEALTH CARE EDUCATION/TRAINING PROGRAM

## 2025-07-28 PROCEDURE — 3008F BODY MASS INDEX DOCD: CPT | Mod: CPTII,S$GLB,, | Performed by: STUDENT IN AN ORGANIZED HEALTH CARE EDUCATION/TRAINING PROGRAM

## 2025-07-28 PROCEDURE — 99205 OFFICE O/P NEW HI 60 MIN: CPT | Mod: S$GLB,,, | Performed by: STUDENT IN AN ORGANIZED HEALTH CARE EDUCATION/TRAINING PROGRAM

## 2025-07-28 PROCEDURE — 1160F RVW MEDS BY RX/DR IN RCRD: CPT | Mod: CPTII,S$GLB,, | Performed by: STUDENT IN AN ORGANIZED HEALTH CARE EDUCATION/TRAINING PROGRAM

## 2025-07-28 PROCEDURE — 3077F SYST BP >= 140 MM HG: CPT | Mod: CPTII,S$GLB,, | Performed by: STUDENT IN AN ORGANIZED HEALTH CARE EDUCATION/TRAINING PROGRAM

## 2025-07-28 PROCEDURE — 1159F MED LIST DOCD IN RCRD: CPT | Mod: CPTII,S$GLB,, | Performed by: STUDENT IN AN ORGANIZED HEALTH CARE EDUCATION/TRAINING PROGRAM

## 2025-07-28 PROCEDURE — 88173 CYTOPATH EVAL FNA REPORT: CPT | Mod: TC | Performed by: STUDENT IN AN ORGANIZED HEALTH CARE EDUCATION/TRAINING PROGRAM

## 2025-07-28 PROCEDURE — 3080F DIAST BP >= 90 MM HG: CPT | Mod: CPTII,S$GLB,, | Performed by: STUDENT IN AN ORGANIZED HEALTH CARE EDUCATION/TRAINING PROGRAM

## 2025-07-28 PROCEDURE — 99999 PR PBB SHADOW E&M-EST. PATIENT-LVL IV: CPT | Mod: PBBFAC,,, | Performed by: STUDENT IN AN ORGANIZED HEALTH CARE EDUCATION/TRAINING PROGRAM
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
## 2025-07-29 ENCOUNTER — TELEPHONE (OUTPATIENT)
Dept: FAMILY MEDICINE | Facility: CLINIC | Age: 65
End: 2025-07-29
Payer: COMMERCIAL

## 2025-07-30 LAB
ESTROGEN SERPL-MCNC: NORMAL PG/ML
INSULIN SERPL-ACNC: NORMAL U[IU]/ML
LAB AP GROSS DESCRIPTION: NORMAL
LAB AP NON-GYN INTERPRETATION SPECIMEN 1: NORMAL
LAB AP PERFORMING LOCATION(S): NORMAL

## 2025-07-31 ENCOUNTER — TELEPHONE (OUTPATIENT)
Dept: HEMATOLOGY/ONCOLOGY | Facility: CLINIC | Age: 65
End: 2025-07-31
Payer: COMMERCIAL

## 2025-07-31 DIAGNOSIS — K11.8 PAROTID MASS: Primary | ICD-10-CM

## 2025-07-31 NOTE — TELEPHONE ENCOUNTER
Pt calling to request that his bx be performed at the soonest availability.  Will notify BLAISE Clements and Dr Gonzalez and get back with pt.

## 2025-07-31 NOTE — TELEPHONE ENCOUNTER
Left VM to review non-diagnostic result. Will order formal FNA through the radiology department. Call back with any questions

## 2025-07-31 NOTE — TELEPHONE ENCOUNTER
Copied from CRM #[id number]. Topic: General Inquiry - Return Call  >> Jul 31, 2025  1:53 PM Kulwant wrote:  Type:  Patient Returning Call    Who Called:  pt   Who Left Message for Patient:  BLAISE Scott  Does the patient know what this is regarding?:  yes  Best Call Back Number:  [phone]  Additional Information:  pt is requesting a call back regarding a few questions he has

## 2025-08-01 NOTE — TELEPHONE ENCOUNTER
Notified pt that his bx order from Dr Gonzalez has been received by IR and he will be contacted for an appointment.  Pt verbalized understanding.

## 2025-08-04 ENCOUNTER — TELEPHONE (OUTPATIENT)
Dept: HEMATOLOGY/ONCOLOGY | Facility: CLINIC | Age: 65
End: 2025-08-04
Payer: COMMERCIAL

## 2025-08-04 ENCOUNTER — TELEPHONE (OUTPATIENT)
Dept: INTERVENTIONAL RADIOLOGY/VASCULAR | Facility: CLINIC | Age: 65
End: 2025-08-04
Payer: COMMERCIAL

## 2025-08-04 DIAGNOSIS — R22.1 NECK MASS: Primary | ICD-10-CM

## 2025-08-04 DIAGNOSIS — K11.8 PAROTID MASS: Primary | ICD-10-CM

## 2025-08-04 RX ORDER — HYDROCODONE BITARTRATE AND ACETAMINOPHEN 5; 325 MG/1; MG/1
1 TABLET ORAL EVERY 6 HOURS PRN
Qty: 20 TABLET | Refills: 0 | Status: SHIPPED | OUTPATIENT
Start: 2025-08-04

## 2025-08-04 NOTE — TELEPHONE ENCOUNTER
Discussed pt's upcoming appts for MRI and bx.  Assisted pt making appt with Dr Gonzalez to review bx and MRI results.    Will send refill request to Dr Gonzalez.  Pt denies further questions or concerns.

## 2025-08-04 NOTE — TELEPHONE ENCOUNTER
Copied from CRM #[id number]. Topic: Medications - Medication Refill  >> Aug 4, 2025 12:53 PM Yamila wrote:  Who Called: Quentin    Refill or New Rx:new    RX Name and Strength:HYDROcodone-acetaminophen (NORCO) 5-325 mg per tablet    Preferred Pharmacy with phone number:[id number] - [place], [place] - [address] [facility] [address] Phone: [phone] Fax: [phone]  Would the patient rather a call back or a response via [facility]? Call back    Best Call Back Number:[phone]    Additional Information: pt stated he previous ENT provider that referred him over to Dr Gonzalez originally prescribed this medication and he is running out//pt also stated no one had reached out to him from Interventional radiology

## 2025-08-04 NOTE — TELEPHONE ENCOUNTER
Copied from CRM #[id number]. Topic: General Inquiry - Return Call  >> Aug 4, 2025  8:09 AM Shanda wrote:  Type:  Patient Returning Call    Who Called:  Patient   Who Left Message for Patient:  Starr Scott NP  Does the patient know what this is regarding?:  Yes  Best Call Back Number:  [phone]  Additional Information:  Can we please call pt back to discuss needs to find out what is scheduling next. Please call also to discuss possibly ordering him something for his pain since Tylenol and Advil not effective. Thank You.

## 2025-08-08 ENCOUNTER — DOCUMENTATION ONLY (OUTPATIENT)
Dept: HEMATOLOGY/ONCOLOGY | Facility: CLINIC | Age: 65
End: 2025-08-08
Payer: COMMERCIAL

## 2025-08-08 NOTE — NURSING
Chart reviewed after visit with Dr. Marion Gonzalez. Further workup Is pending. [facility] No [facility] navigation needs

## 2025-08-09 ENCOUNTER — HOSPITAL ENCOUNTER (OUTPATIENT)
Dept: RADIOLOGY | Facility: HOSPITAL | Age: 65
Discharge: HOME | End: 2025-08-09
Attending: NURSE PRACTITIONER
Payer: COMMERCIAL

## 2025-08-09 DIAGNOSIS — K11.8 PAROTID MASS: ICD-10-CM

## 2025-08-09 PROCEDURE — A9585 GADOBUTROL INJECTION: HCPCS | Performed by: NURSE PRACTITIONER

## 2025-08-09 PROCEDURE — 70543 MRI ORBT/FAC/NCK W/O &W/DYE: CPT | Mod: TC

## 2025-08-09 PROCEDURE — 70543 MRI ORBT/FAC/NCK W/O &W/DYE: CPT | Mod: 26,,, | Performed by: RADIOLOGY

## 2025-08-09 PROCEDURE — 25500020 PHARM REV CODE 255: Performed by: NURSE PRACTITIONER

## 2025-08-09 RX ORDER — GADOBUTROL 604.72 MG/ML
10 INJECTION INTRAVENOUS
Status: COMPLETED | OUTPATIENT
Start: 2025-08-09 | End: 2025-08-09

## 2025-08-09 RX ADMIN — GADOBUTROL 10 ML: 604.72 INJECTION INTRAVENOUS at 02:08

## 2025-08-11 ENCOUNTER — TELEPHONE (OUTPATIENT)
Dept: INTERVENTIONAL RADIOLOGY/VASCULAR | Facility: HOSPITAL | Age: 65
End: 2025-08-11
Payer: COMMERCIAL

## 2025-08-11 RX ORDER — SODIUM CHLORIDE 9 MG/ML
INJECTION, SOLUTION INTRAVENOUS CONTINUOUS
Status: CANCELLED | OUTPATIENT
Start: 2025-08-11

## 2025-08-11 RX ORDER — LIDOCAINE HYDROCHLORIDE 10 MG/ML
1 INJECTION, SOLUTION EPIDURAL; INFILTRATION; INTRACAUDAL; PERINEURAL ONCE
Status: CANCELLED | OUTPATIENT
Start: 2025-08-11 | End: 2025-08-11

## 2025-08-12 ENCOUNTER — HOSPITAL ENCOUNTER (OUTPATIENT)
Dept: RADIOLOGY | Facility: HOSPITAL | Age: 65
Discharge: HOME | End: 2025-08-12
Attending: FAMILY MEDICINE
Payer: COMMERCIAL

## 2025-08-12 VITALS
RESPIRATION RATE: 16 BRPM | OXYGEN SATURATION: 97 % | HEIGHT: 70 IN | BODY MASS INDEX: 37.22 KG/M2 | SYSTOLIC BLOOD PRESSURE: 153 MMHG | TEMPERATURE: 98 F | DIASTOLIC BLOOD PRESSURE: 90 MMHG | HEART RATE: 62 BPM | WEIGHT: 260 LBS

## 2025-08-12 DIAGNOSIS — K11.8 PAROTID MASS: ICD-10-CM

## 2025-08-12 DIAGNOSIS — R59.1 LAD (LYMPHADENOPATHY): ICD-10-CM

## 2025-08-12 PROCEDURE — 99152 MOD SED SAME PHYS/QHP 5/>YRS: CPT

## 2025-08-12 PROCEDURE — 63600175 PHARM REV CODE 636 W HCPCS: Performed by: RADIOLOGY

## 2025-08-12 PROCEDURE — 99153 MOD SED SAME PHYS/QHP EA: CPT

## 2025-08-12 RX ORDER — FENTANYL CITRATE 50 UG/ML
INJECTION, SOLUTION INTRAMUSCULAR; INTRAVENOUS
Status: COMPLETED | OUTPATIENT
Start: 2025-08-12 | End: 2025-08-12

## 2025-08-12 RX ORDER — LIDOCAINE HYDROCHLORIDE 10 MG/ML
INJECTION, SOLUTION INFILTRATION; PERINEURAL
Status: COMPLETED | OUTPATIENT
Start: 2025-08-12 | End: 2025-08-12

## 2025-08-12 RX ORDER — MIDAZOLAM HYDROCHLORIDE 1 MG/ML
INJECTION, SOLUTION INTRAMUSCULAR; INTRAVENOUS
Status: COMPLETED | OUTPATIENT
Start: 2025-08-12 | End: 2025-08-12

## 2025-08-12 RX ADMIN — MIDAZOLAM 2 MG: 1 INJECTION INTRAMUSCULAR; INTRAVENOUS at 01:08

## 2025-08-12 RX ADMIN — LIDOCAINE HYDROCHLORIDE 10 ML: 10 INJECTION, SOLUTION INFILTRATION; PERINEURAL at 01:08

## 2025-08-12 RX ADMIN — FENTANYL CITRATE 50 MCG: 50 INJECTION, SOLUTION INTRAMUSCULAR; INTRAVENOUS at 01:08

## 2025-08-13 DIAGNOSIS — R22.1 NECK MASS: ICD-10-CM

## 2025-08-13 RX ORDER — HYDROCODONE BITARTRATE AND ACETAMINOPHEN 5; 325 MG/1; MG/1
1 TABLET ORAL EVERY 6 HOURS PRN
Qty: 20 TABLET | Refills: 0 | Status: SHIPPED | OUTPATIENT
Start: 2025-08-13

## 2025-08-14 ENCOUNTER — TUMOR BOARD CONFERENCE (OUTPATIENT)
Dept: HEMATOLOGY/ONCOLOGY | Facility: CLINIC | Age: 65
End: 2025-08-14
Payer: COMMERCIAL

## 2025-08-19 ENCOUNTER — TELEPHONE (OUTPATIENT)
Dept: HEMATOLOGY/ONCOLOGY | Facility: CLINIC | Age: 65
End: 2025-08-19
Payer: COMMERCIAL

## 2025-08-19 ENCOUNTER — OFFICE VISIT (OUTPATIENT)
Dept: OTOLARYNGOLOGY | Facility: CLINIC | Age: 65
End: 2025-08-19
Payer: COMMERCIAL

## 2025-08-19 ENCOUNTER — TELEPHONE (OUTPATIENT)
Dept: PALLIATIVE MEDICINE | Facility: CLINIC | Age: 65
End: 2025-08-19
Payer: COMMERCIAL

## 2025-08-19 VITALS
WEIGHT: 257.25 LBS | SYSTOLIC BLOOD PRESSURE: 144 MMHG | DIASTOLIC BLOOD PRESSURE: 84 MMHG | BODY MASS INDEX: 36.92 KG/M2

## 2025-08-19 DIAGNOSIS — C90.30 SOLITARY PLASMACYTOMA NOT HAVING ACHIEVED REMISSION: Primary | ICD-10-CM

## 2025-08-19 PROCEDURE — 99999 PR PBB SHADOW E&M-EST. PATIENT-LVL III: CPT | Mod: PBBFAC,,, | Performed by: STUDENT IN AN ORGANIZED HEALTH CARE EDUCATION/TRAINING PROGRAM

## 2025-08-19 RX ORDER — OXYCODONE HYDROCHLORIDE 5 MG/1
5 TABLET ORAL EVERY 4 HOURS PRN
Qty: 30 TABLET | Refills: 0 | Status: SHIPPED | OUTPATIENT
Start: 2025-08-19

## 2025-08-21 ENCOUNTER — DOCUMENTATION ONLY (OUTPATIENT)
Dept: HEMATOLOGY/ONCOLOGY | Facility: CLINIC | Age: 65
End: 2025-08-21
Payer: COMMERCIAL

## 2025-08-21 ENCOUNTER — OFFICE VISIT (OUTPATIENT)
Dept: RADIATION ONCOLOGY | Facility: CLINIC | Age: 65
End: 2025-08-21
Payer: COMMERCIAL

## 2025-08-21 ENCOUNTER — TUMOR BOARD CONFERENCE (OUTPATIENT)
Dept: HEMATOLOGY/ONCOLOGY | Facility: CLINIC | Age: 65
End: 2025-08-21
Payer: COMMERCIAL

## 2025-08-21 ENCOUNTER — TELEPHONE (OUTPATIENT)
Dept: HEMATOLOGY/ONCOLOGY | Facility: CLINIC | Age: 65
End: 2025-08-21
Payer: COMMERCIAL

## 2025-08-21 ENCOUNTER — TELEPHONE (OUTPATIENT)
Dept: PALLIATIVE MEDICINE | Facility: CLINIC | Age: 65
End: 2025-08-21
Payer: COMMERCIAL

## 2025-08-21 VITALS
BODY MASS INDEX: 37.53 KG/M2 | HEART RATE: 70 BPM | OXYGEN SATURATION: 94 % | SYSTOLIC BLOOD PRESSURE: 149 MMHG | WEIGHT: 261.56 LBS | DIASTOLIC BLOOD PRESSURE: 87 MMHG | RESPIRATION RATE: 20 BRPM

## 2025-08-21 DIAGNOSIS — D49.89 PLASMA CELL NEOPLASM: Primary | ICD-10-CM

## 2025-08-21 DIAGNOSIS — C90.30 PLASMACYTOMA, UNSPECIFIED PLASMACYTOMA TYPE, UNSPECIFIED WHETHER REMISSION ACHIEVED: Primary | ICD-10-CM

## 2025-08-21 PROCEDURE — 99999 PR PBB SHADOW E&M-EST. PATIENT-LVL III: CPT | Mod: PBBFAC,,, | Performed by: STUDENT IN AN ORGANIZED HEALTH CARE EDUCATION/TRAINING PROGRAM

## 2025-08-25 ENCOUNTER — PATIENT MESSAGE (OUTPATIENT)
Dept: OTOLARYNGOLOGY | Facility: CLINIC | Age: 65
End: 2025-08-25
Payer: COMMERCIAL

## 2025-08-25 ENCOUNTER — PATIENT MESSAGE (OUTPATIENT)
Dept: ADMINISTRATIVE | Facility: HOSPITAL | Age: 65
End: 2025-08-25
Payer: COMMERCIAL

## 2025-08-25 DIAGNOSIS — C90.30 SOLITARY PLASMACYTOMA NOT HAVING ACHIEVED REMISSION: ICD-10-CM

## 2025-08-25 RX ORDER — OXYCODONE HYDROCHLORIDE 5 MG/1
5 TABLET ORAL EVERY 4 HOURS PRN
Qty: 30 TABLET | Refills: 0 | Status: SHIPPED | OUTPATIENT
Start: 2025-08-25 | End: 2025-08-26 | Stop reason: SDUPTHER

## 2025-08-26 ENCOUNTER — HOSPITAL ENCOUNTER (OUTPATIENT)
Dept: RADIOLOGY | Facility: HOSPITAL | Age: 65
Discharge: HOME | End: 2025-08-26
Attending: STUDENT IN AN ORGANIZED HEALTH CARE EDUCATION/TRAINING PROGRAM
Payer: COMMERCIAL

## 2025-08-26 ENCOUNTER — OFFICE VISIT (OUTPATIENT)
Dept: PALLIATIVE MEDICINE | Facility: CLINIC | Age: 65
End: 2025-08-26
Payer: COMMERCIAL

## 2025-08-26 VITALS
HEART RATE: 66 BPM | OXYGEN SATURATION: 97 % | WEIGHT: 260.56 LBS | RESPIRATION RATE: 18 BRPM | BODY MASS INDEX: 37.39 KG/M2 | DIASTOLIC BLOOD PRESSURE: 79 MMHG | TEMPERATURE: 98 F | SYSTOLIC BLOOD PRESSURE: 167 MMHG

## 2025-08-26 DIAGNOSIS — K59.00 CONSTIPATION, UNSPECIFIED CONSTIPATION TYPE: ICD-10-CM

## 2025-08-26 DIAGNOSIS — D49.89 PLASMA CELL NEOPLASM: ICD-10-CM

## 2025-08-26 DIAGNOSIS — Z71.89 ACP (ADVANCE CARE PLANNING): ICD-10-CM

## 2025-08-26 DIAGNOSIS — M79.2 NEUROPATHIC PAIN: ICD-10-CM

## 2025-08-26 DIAGNOSIS — C90.30 SOLITARY PLASMACYTOMA NOT HAVING ACHIEVED REMISSION: Primary | ICD-10-CM

## 2025-08-26 DIAGNOSIS — Z51.5 PALLIATIVE CARE BY SPECIALIST: ICD-10-CM

## 2025-08-26 DIAGNOSIS — G89.3 NEOPLASM RELATED PAIN: ICD-10-CM

## 2025-08-26 LAB — POCT GLUCOSE: 104 MG/DL (ref 70–110)

## 2025-08-26 PROCEDURE — A9552 F18 FDG: HCPCS | Performed by: STUDENT IN AN ORGANIZED HEALTH CARE EDUCATION/TRAINING PROGRAM

## 2025-08-26 PROCEDURE — 78815 PET IMAGE W/CT SKULL-THIGH: CPT | Mod: 26,PI,, | Performed by: STUDENT IN AN ORGANIZED HEALTH CARE EDUCATION/TRAINING PROGRAM

## 2025-08-26 PROCEDURE — 99999 PR PBB SHADOW E&M-EST. PATIENT-LVL IV: CPT | Mod: PBBFAC,,, | Performed by: EMERGENCY MEDICINE

## 2025-08-26 PROCEDURE — 78815 PET IMAGE W/CT SKULL-THIGH: CPT | Mod: TC

## 2025-08-26 RX ORDER — SENNOSIDES 8.6 MG/1
2 TABLET ORAL 2 TIMES DAILY
Qty: 120 TABLET | Refills: 2 | Status: SHIPPED | OUTPATIENT
Start: 2025-08-26 | End: 2025-11-24

## 2025-08-26 RX ORDER — FLUDEOXYGLUCOSE F18 500 MCI/ML
12.39 INJECTION INTRAVENOUS
Status: COMPLETED | OUTPATIENT
Start: 2025-08-26 | End: 2025-08-26

## 2025-08-26 RX ORDER — POLYETHYLENE GLYCOL 3350 17 G/17G
17 POWDER, FOR SOLUTION ORAL DAILY
Qty: 510 G | Refills: 0 | Status: SHIPPED | OUTPATIENT
Start: 2025-08-26

## 2025-08-26 RX ORDER — NORTRIPTYLINE HYDROCHLORIDE 25 MG/1
25 CAPSULE ORAL NIGHTLY
Qty: 30 CAPSULE | Refills: 11 | Status: SHIPPED | OUTPATIENT
Start: 2025-08-26 | End: 2026-08-26

## 2025-08-26 RX ORDER — OXYCODONE HYDROCHLORIDE 5 MG/1
5 TABLET ORAL EVERY 4 HOURS PRN
Qty: 120 TABLET | Refills: 0 | Status: SHIPPED | OUTPATIENT
Start: 2025-08-30

## 2025-08-26 RX ADMIN — FLUDEOXYGLUCOSE F-18 12.39 MILLICURIE: 500 INJECTION INTRAVENOUS at 11:08

## 2025-08-27 ENCOUNTER — HOSPITAL ENCOUNTER (OUTPATIENT)
Dept: RADIATION THERAPY | Facility: HOSPITAL | Age: 65
Discharge: HOME | End: 2025-08-27
Payer: COMMERCIAL

## 2025-08-27 ENCOUNTER — TELEPHONE (OUTPATIENT)
Dept: RADIATION ONCOLOGY | Facility: HOSPITAL | Age: 65
End: 2025-08-27
Payer: COMMERCIAL

## 2025-08-27 PROCEDURE — 77334 RADIATION TREATMENT AID(S): CPT | Mod: TC | Performed by: STUDENT IN AN ORGANIZED HEALTH CARE EDUCATION/TRAINING PROGRAM

## 2025-08-27 PROCEDURE — 77014 PR  CT GUIDANCE PLACEMENT RAD THERAPY FIELDS: CPT | Mod: 26,,, | Performed by: STUDENT IN AN ORGANIZED HEALTH CARE EDUCATION/TRAINING PROGRAM

## 2025-08-27 PROCEDURE — 77014 HC CT GUIDANCE RADIATION THERAPY FLDS PLACEMENT: CPT | Mod: TC | Performed by: STUDENT IN AN ORGANIZED HEALTH CARE EDUCATION/TRAINING PROGRAM

## 2025-08-27 PROCEDURE — 77334 RADIATION TREATMENT AID(S): CPT | Mod: 26,,, | Performed by: STUDENT IN AN ORGANIZED HEALTH CARE EDUCATION/TRAINING PROGRAM

## 2025-08-27 PROCEDURE — 77263 THER RADIOLOGY TX PLNG CPLX: CPT | Mod: ,,, | Performed by: STUDENT IN AN ORGANIZED HEALTH CARE EDUCATION/TRAINING PROGRAM

## 2025-08-28 ENCOUNTER — OFFICE VISIT (OUTPATIENT)
Dept: OTOLARYNGOLOGY | Facility: CLINIC | Age: 65
End: 2025-08-28
Payer: COMMERCIAL

## 2025-08-28 DIAGNOSIS — Z01.20 DENTAL EXAMINATION: Primary | ICD-10-CM

## 2025-08-29 ENCOUNTER — LAB VISIT (OUTPATIENT)
Dept: LAB | Facility: HOSPITAL | Age: 65
End: 2025-08-29
Payer: COMMERCIAL

## 2025-08-29 DIAGNOSIS — C90.30 PLASMACYTOMA, UNSPECIFIED PLASMACYTOMA TYPE, UNSPECIFIED WHETHER REMISSION ACHIEVED: ICD-10-CM

## 2025-08-29 LAB
ABSOLUTE EOSINOPHIL (OHS): 0.2 K/UL
ABSOLUTE MONOCYTE (OHS): 0.92 K/UL (ref 0.3–1)
ABSOLUTE NEUTROPHIL COUNT (OHS): 5.71 K/UL (ref 1.8–7.7)
ALBUMIN SERPL BCP-MCNC: 3.9 G/DL (ref 3.5–5.2)
ALP SERPL-CCNC: 70 UNIT/L (ref 40–150)
ALT SERPL W/O P-5'-P-CCNC: 35 UNIT/L (ref 0–55)
ANION GAP (OHS): 10 MMOL/L (ref 8–16)
AST SERPL-CCNC: 20 UNIT/L (ref 0–50)
BASOPHILS # BLD AUTO: 0.06 K/UL
BASOPHILS NFR BLD AUTO: 0.6 %
BETA 2 MICROGLOBILIN (OHS): 2 UG/ML (ref 0–2.5)
BILIRUB SERPL-MCNC: 0.4 MG/DL (ref 0.1–1)
BUN SERPL-MCNC: 19 MG/DL (ref 8–23)
CALCIUM SERPL-MCNC: 9.3 MG/DL (ref 8.7–10.5)
CHLORIDE SERPL-SCNC: 102 MMOL/L (ref 95–110)
CO2 SERPL-SCNC: 24 MMOL/L (ref 23–29)
CREAT SERPL-MCNC: 1.4 MG/DL (ref 0.5–1.4)
ERYTHROCYTE [DISTWIDTH] IN BLOOD BY AUTOMATED COUNT: 12.3 % (ref 11.5–14.5)
GFR SERPLBLD CREATININE-BSD FMLA CKD-EPI: 56 ML/MIN/1.73/M2
GLUCOSE SERPL-MCNC: 116 MG/DL (ref 70–110)
HCT VFR BLD AUTO: 45.9 % (ref 40–54)
HGB BLD-MCNC: 15.9 GM/DL (ref 14–18)
IGA SERPL-MCNC: 159 MG/DL (ref 40–350)
IGG SERPL-MCNC: 2264 MG/DL (ref 650–1600)
IGM SERPL-MCNC: 24 MG/DL (ref 50–300)
IMM GRANULOCYTES # BLD AUTO: 0.02 K/UL (ref 0–0.04)
IMM GRANULOCYTES NFR BLD AUTO: 0.2 % (ref 0–0.5)
INDIRECT COOMBS: NORMAL
LDH SERPL-CCNC: 149 U/L (ref 110–260)
LYMPHOCYTES # BLD AUTO: 2.48 K/UL (ref 1–4.8)
MCH RBC QN AUTO: 31.8 PG (ref 27–31)
MCHC RBC AUTO-ENTMCNC: 34.6 G/DL (ref 32–36)
MCV RBC AUTO: 92 FL (ref 82–98)
NUCLEATED RBC (/100WBC) (OHS): 0 /100 WBC
PLATELET # BLD AUTO: 261 K/UL (ref 150–450)
PMV BLD AUTO: 9.6 FL (ref 9.2–12.9)
POTASSIUM SERPL-SCNC: 4.5 MMOL/L (ref 3.5–5.1)
PROT SERPL-MCNC: 7.8 GM/DL (ref 6–8.4)
RBC # BLD AUTO: 5 M/UL (ref 4.6–6.2)
RELATIVE EOSINOPHIL (OHS): 2.1 %
RELATIVE LYMPHOCYTE (OHS): 26.4 % (ref 18–48)
RELATIVE MONOCYTE (OHS): 9.8 % (ref 4–15)
RELATIVE NEUTROPHIL (OHS): 60.9 % (ref 38–73)
RH BLD: NORMAL
SODIUM SERPL-SCNC: 136 MMOL/L (ref 136–145)
SPECIMEN OUTDATE: NORMAL
WBC # BLD AUTO: 9.39 K/UL (ref 3.9–12.7)

## 2025-08-29 PROCEDURE — 86900 BLOOD TYPING SEROLOGIC ABO: CPT

## 2025-08-29 PROCEDURE — 82232 ASSAY OF BETA-2 PROTEIN: CPT

## 2025-08-29 PROCEDURE — 82784 ASSAY IGA/IGD/IGG/IGM EACH: CPT | Mod: 59

## 2025-08-29 PROCEDURE — 84165 PROTEIN E-PHORESIS SERUM: CPT

## 2025-08-29 PROCEDURE — 85025 COMPLETE CBC W/AUTO DIFF WBC: CPT

## 2025-08-29 PROCEDURE — 86334 IMMUNOFIX E-PHORESIS SERUM: CPT

## 2025-08-29 PROCEDURE — 83615 LACTATE (LD) (LDH) ENZYME: CPT

## 2025-08-29 PROCEDURE — 36415 COLL VENOUS BLD VENIPUNCTURE: CPT

## 2025-08-29 PROCEDURE — 82040 ASSAY OF SERUM ALBUMIN: CPT

## 2025-08-29 PROCEDURE — 83521 IG LIGHT CHAINS FREE EACH: CPT

## 2025-09-01 DIAGNOSIS — N52.9 ERECTILE DYSFUNCTION, UNSPECIFIED ERECTILE DYSFUNCTION TYPE: ICD-10-CM

## 2025-09-02 LAB
ALBUMIN, SPE (OHS): 4.05 G/DL (ref 3.35–5.55)
ALPHA 1 GLOB (OHS): 0.28 GM/DL (ref 0.17–0.41)
ALPHA 2 GLOB (OHS): 0.92 GM/DL (ref 0.43–0.99)
BETA GLOB (OHS): 0.77 GM/DL (ref 0.5–1.1)
GAMMA GLOBULIN (OHS): 1.87 GM/DL (ref 0.67–1.58)
KAPPA LC FREE SER-MCNC: 0.15 MG/L (ref 0.26–1.65)
KAPPA LC FREE/LAMBDA FREE SER: 1.45 MG/DL (ref 0.33–1.94)
LAMBDA LC FREE SERPL-MCNC: 9.66 MG/DL (ref 0.57–2.63)
PROT SERPL-MCNC: 7.9 GM/DL (ref 6–8.4)

## 2025-09-02 RX ORDER — SILDENAFIL 100 MG/1
TABLET, FILM COATED ORAL
Qty: 24 TABLET | Refills: 5 | Status: SHIPPED | OUTPATIENT
Start: 2025-09-02

## 2025-09-03 LAB
PATHOLOGIST INTERPRETATION - IFE SERUM (OHS): NORMAL
PATHOLOGIST REVIEW - SPE (OHS): NORMAL

## 2025-09-05 ENCOUNTER — HOSPITAL ENCOUNTER (OUTPATIENT)
Dept: RADIOLOGY | Facility: HOSPITAL | Age: 65
Discharge: HOME | End: 2025-09-05
Attending: STUDENT IN AN ORGANIZED HEALTH CARE EDUCATION/TRAINING PROGRAM
Payer: COMMERCIAL

## 2025-09-05 DIAGNOSIS — C90.30 PLASMACYTOMA, UNSPECIFIED PLASMACYTOMA TYPE, UNSPECIFIED WHETHER REMISSION ACHIEVED: Primary | ICD-10-CM

## 2025-09-05 DIAGNOSIS — D49.89 PLASMA CELL NEOPLASM: ICD-10-CM

## 2025-09-05 PROBLEM — C90.00 MULTIPLE MYELOMA: Status: ACTIVE | Noted: 2025-09-05

## 2025-09-05 PROCEDURE — A9585 GADOBUTROL INJECTION: HCPCS | Performed by: STUDENT IN AN ORGANIZED HEALTH CARE EDUCATION/TRAINING PROGRAM

## 2025-09-05 PROCEDURE — 73220 MRI UPPR EXTREMITY W/O&W/DYE: CPT | Mod: TC,RT

## 2025-09-05 PROCEDURE — 25500020 PHARM REV CODE 255: Performed by: STUDENT IN AN ORGANIZED HEALTH CARE EDUCATION/TRAINING PROGRAM

## 2025-09-05 RX ORDER — GADOBUTROL 604.72 MG/ML
10 INJECTION INTRAVENOUS
Status: COMPLETED | OUTPATIENT
Start: 2025-09-05 | End: 2025-09-05

## 2025-09-05 RX ADMIN — GADOBUTROL 10 ML: 604.72 INJECTION INTRAVENOUS at 06:09
